# Patient Record
Sex: MALE | Race: WHITE | NOT HISPANIC OR LATINO | Employment: OTHER | URBAN - NONMETROPOLITAN AREA
[De-identification: names, ages, dates, MRNs, and addresses within clinical notes are randomized per-mention and may not be internally consistent; named-entity substitution may affect disease eponyms.]

---

## 2021-03-03 ENCOUNTER — HOSPITAL ENCOUNTER (OUTPATIENT)
Dept: GENERAL RADIOLOGY | Facility: HOSPITAL | Age: 51
Discharge: HOME | End: 2021-03-03
Attending: NURSE PRACTITIONER

## 2021-03-03 ENCOUNTER — OFFICE VISIT CONVERTED (OUTPATIENT)
Dept: FAMILY MEDICINE CLINIC | Facility: CLINIC | Age: 51
End: 2021-03-03
Attending: NURSE PRACTITIONER

## 2021-03-03 LAB
ALBUMIN SERPL-MCNC: 4.4 G/DL (ref 3.5–5)
ALBUMIN/GLOB SERPL: 1.6 {RATIO} (ref 1.4–2.6)
ALP SERPL-CCNC: 111 U/L (ref 53–128)
ALT SERPL-CCNC: 25 U/L (ref 10–40)
ANION GAP SERPL CALC-SCNC: 20 MMOL/L (ref 8–19)
AST SERPL-CCNC: 24 U/L (ref 15–50)
BASOPHILS # BLD AUTO: 0.05 10*3/UL (ref 0–0.2)
BASOPHILS NFR BLD AUTO: 0.9 % (ref 0–3)
BILIRUB SERPL-MCNC: 0.42 MG/DL (ref 0.2–1.3)
BUN SERPL-MCNC: 8 MG/DL (ref 5–25)
BUN/CREAT SERPL: 8 {RATIO} (ref 6–20)
CALCIUM SERPL-MCNC: 9.7 MG/DL (ref 8.7–10.4)
CHLORIDE SERPL-SCNC: 101 MMOL/L (ref 99–111)
CHOLEST SERPL-MCNC: 204 MG/DL (ref 107–200)
CHOLEST/HDLC SERPL: 5.5 {RATIO} (ref 3–6)
CONV ABS IMM GRAN: 0.01 10*3/UL (ref 0–0.2)
CONV CO2: 24 MMOL/L (ref 22–32)
CONV IMMATURE GRAN: 0.2 % (ref 0–1.8)
CONV TOTAL PROTEIN: 7.1 G/DL (ref 6.3–8.2)
CREAT UR-MCNC: 0.95 MG/DL (ref 0.7–1.2)
DEPRECATED RDW RBC AUTO: 41.2 FL (ref 35.1–43.9)
EOSINOPHIL # BLD AUTO: 0.14 10*3/UL (ref 0–0.7)
EOSINOPHIL # BLD AUTO: 2.7 % (ref 0–7)
ERYTHROCYTE [DISTWIDTH] IN BLOOD BY AUTOMATED COUNT: 12.5 % (ref 11.6–14.4)
GFR SERPLBLD BASED ON 1.73 SQ M-ARVRAT: >60 ML/MIN/{1.73_M2}
GLOBULIN UR ELPH-MCNC: 2.7 G/DL (ref 2–3.5)
GLUCOSE SERPL-MCNC: 99 MG/DL (ref 70–99)
HCT VFR BLD AUTO: 44.8 % (ref 42–52)
HDLC SERPL-MCNC: 37 MG/DL (ref 40–60)
HGB BLD-MCNC: 14.9 G/DL (ref 14–18)
LDLC SERPL CALC-MCNC: 122 MG/DL (ref 70–100)
LYMPHOCYTES # BLD AUTO: 1.09 10*3/UL (ref 1–5)
LYMPHOCYTES NFR BLD AUTO: 20.6 % (ref 20–45)
MCH RBC QN AUTO: 30.2 PG (ref 27–31)
MCHC RBC AUTO-ENTMCNC: 33.3 G/DL (ref 33–37)
MCV RBC AUTO: 90.7 FL (ref 80–96)
MONOCYTES # BLD AUTO: 0.34 10*3/UL (ref 0.2–1.2)
MONOCYTES NFR BLD AUTO: 6.4 % (ref 3–10)
NEUTROPHILS # BLD AUTO: 3.65 10*3/UL (ref 2–8)
NEUTROPHILS NFR BLD AUTO: 69.2 % (ref 30–85)
NRBC CBCN: 0 % (ref 0–0.7)
OSMOLALITY SERPL CALC.SUM OF ELEC: 290 MOSM/KG (ref 273–304)
PLATELET # BLD AUTO: 283 10*3/UL (ref 130–400)
PMV BLD AUTO: 11.4 FL (ref 9.4–12.4)
POTASSIUM SERPL-SCNC: 4.1 MMOL/L (ref 3.5–5.3)
PSA SERPL-MCNC: 1.3 NG/ML (ref 0–4)
RBC # BLD AUTO: 4.94 10*6/UL (ref 4.7–6.1)
SODIUM SERPL-SCNC: 141 MMOL/L (ref 135–147)
TRIGL SERPL-MCNC: 225 MG/DL (ref 40–150)
TSH SERPL-ACNC: 1.61 M[IU]/L (ref 0.27–4.2)
VLDLC SERPL-MCNC: 45 MG/DL (ref 5–37)
WBC # BLD AUTO: 5.28 10*3/UL (ref 4.8–10.8)

## 2021-03-09 ENCOUNTER — OFFICE VISIT CONVERTED (OUTPATIENT)
Dept: CARDIOLOGY | Facility: CLINIC | Age: 51
End: 2021-03-09
Attending: SPECIALIST

## 2021-03-16 ENCOUNTER — OFFICE VISIT CONVERTED (OUTPATIENT)
Dept: CARDIOLOGY | Facility: CLINIC | Age: 51
End: 2021-03-16
Attending: SPECIALIST

## 2021-04-05 ENCOUNTER — CONVERSION ENCOUNTER (OUTPATIENT)
Dept: OTHER | Facility: HOSPITAL | Age: 51
End: 2021-04-05

## 2021-04-05 ENCOUNTER — OFFICE VISIT CONVERTED (OUTPATIENT)
Dept: FAMILY MEDICINE CLINIC | Facility: CLINIC | Age: 51
End: 2021-04-05
Attending: NURSE PRACTITIONER

## 2021-04-20 ENCOUNTER — OFFICE VISIT CONVERTED (OUTPATIENT)
Dept: CARDIOLOGY | Facility: CLINIC | Age: 51
End: 2021-04-20
Attending: SPECIALIST

## 2021-05-10 NOTE — PROCEDURES
Procedure Note      Patient Name: Colt Prieto   Patient ID: [id number]   Sex: Male   YOB: 1970    Primary Care Provider: Franny BAH   Referring Provider: Franny BAH    Visit Date: March 16, 2021    Provider: Jose David Frazier MD   Location: [facility]   Location Address: [address] [place] [address], [facility]   [place]  [id number]   Location Phone: [phone]          FINAL REPORT   HOLTER MONITOR REPORT  Date: 03/16/2021   Indication: Palpitations      Interpretation Date:  03/18/2021    24-HOUR HOLTER MONITOR      FINDINGS:   The patient was monitored for a period of 24 hours.  The maximum heart rate was 138 beats per minute, the minimum was 53 beats per minute. The average was 138 beats per minute. There were occasional PACs and PVCs.     CONCLUSION:    24-hour Holter monitor study reveals occasional PACs and PVCs. No significant tachy- or bradyarrhythmias.      Jose David Frazier MD  MARY/pap                   Electronically Signed by: Candice Noonan-, Other -Author on March 22, 2021 03:59:21 PM  Electronically Co-signed by: Jose David Frazier MD -Reviewer on March 23, 2021 12:39:51 PM

## 2021-05-10 NOTE — H&P
History and Physical      Patient Name: Colt Prieto   Patient ID: [id number]   Sex: Male   YOB: 1970    Primary Care Provider: Franny BAH   Referring Provider: Franny BAH    Visit Date: March 9, 2021    Provider: Jose David Frazier MD   Location[facility]   Location Address: [address] [place] [address], [address]   [place]  [id number]   Location Phone: [phone]          Chief Complaint     Palpitations and shortness of breath.       History Of Present Illness  Consult requested by: OLVIN Alonso   Colt Prieto is a 50 year old male with a history of asthma since childhood. Has shortness of breath on exertion. Has had some palpitations and increased heart rate, mostly on exertion for the last 3 months. No chest pains. No syncopal or presyncopal episodes. No dizziness.   PAST MEDICAL HISTORY: Positive for arthritis and hip surgery in 1991.   FAMILY HISTORY: Positive for diabetes; hypertension.   PSYCHOSOCIAL HISTORY: Denies alcohol use. Denies tobacco use.   CURRENT MEDICATIONS: Albuterol 90 mcg p.r.n.; Symbicort 160 mcg q.a.m.   ALLERGIES: No known drug allergies.      CARDIAC RISK FACTORS: History of hypertension. Not a smoker. No history of diabetes mellitus. Family history is negative.       Review of Systems  · Constitutional  o Admits  o : good general health lately  o Denies  o : fatigue, recent weight changes   · Eyes  o Denies  o : double vision  · HENT  o Denies  o : hearing loss or ringing, chronic sinus problem, swollen glands in neck  · Cardiovascular  o Admits  o : shortness of breath while walking or lying flat  o Denies  o : chest pain, palpitations (fast, fluttering, or skipping beats), swelling (feet, ankles, hands)  · Respiratory  o Admits  o : asthma or wheezing  o Denies  o : COPD  · Gastrointestinal  o Denies  o : ulcers, nausea or vomiting  · Neurologic  o Denies  o : lightheaded or dizzy, stroke,  "headaches  · Musculoskeletal  o Admits  o : joint pain, back pain  · Endocrine  o Admits  o : heat or cold intolerance  o Denies  o : thyroid disease, diabetes, excessive thirst or urination  · Heme-Lymph  o Denies  o : bleeding or bruising tendency, anemia      Vitals  Date Time BP Position Site L\R Cuff Size HR RR TEMP (F) WT  HT  BMI kg/m2 BSA m2 O2 Sat FR L/min FiO2 HC       03/09/2021 09:57 /88 Sitting    76 - R   146lbs 0oz 5'  9\" 21.56 1.8       03/09/2021 09:57 /94 Sitting    78 - R                   Physical Examination  · Constitutional  o Appearance  o : Awake, alert, cooperative, pleasant.  · Eyes  o Pupils and Irises  o : Pupils equal and reacting to light and accommodation.  · Ears, Nose, Mouth and Throat  o Ears  o : Tympanic membranes are normal.  o Nose  o : Clear with no maxillary tenderness.  o Oral Cavity  o : Clear.  · Neck  o Inspection/Palpation  o : No JVD, bruits, thyromegaly, or adenopathy. Trachea midline. No axillary or cervical lymphadenopathy.  o Thyroid  o : No thyroid enlargement.   · Respiratory  o Inspection of Chest  o : No chest wall deformities, moving equal.  o Auscultation of Lungs  o : Good air entry with vesicular breath sounds.  o Percussion of Chest  o : Resonant bilaterally.   o Palpation of Chest  o : Equal movements bilaterally.  · Cardiovascular  o Heart  o :   § Auscultation of Heart  § : PMI is in the 5th intercostal space. S1 and S2 regular. No S3. No S4. No murmurs.  o Peripheral Vascular System  o :   § Extremities  § : No pedal edema. Peripheral pulses well felt. No cyanosis.  · Gastrointestinal  o Abdominal Examination  o : No organomegaly, masses, tenderness, bruits, or abnormal pulsations. Bowel sounds are normal.  · Musculoskeletal  o General  o : Muscle strength is normal with normal tone.  · Skin and Subcutaneous Tissue  o General Inspection  o : No rash, petechiae, or suspicious skin lesions. Skin turgor is normal.  · EKG  o EKG  o : Was " performed in the office today  o Indications  o : Palpitations.  o Results  o : Showed sinus rhythm, left atrial enlargement.          Assessment     1.  Palpitations and tachycardia, probably related to his asthma. Will do a 24 hour Holter to rule out any significant apnea on echocardiogram. Heart rate is higher, start him on low dose beta blockers.   2.  Essential hypertension. Blood pressures range slightly high. Blood pressure is persistently high. Start him on antihypertensive agent.   3.  Bronchial asthma, continue bronchodilators. Managed by his PMD.     FOLLOW-UP:   See me back in a month.         Jose David Frazier MD  MARY/vh               Electronically Signed by: Katelin Thomas-, OT -Author on March 17, 2021 08:02:39 AM  Electronically Co-signed by: Jose David Frazier MD -Reviewer on March 18, 2021 09:13:01 AM

## 2021-05-10 NOTE — H&P
History and Physical      Patient Name: Colt Prieto   Patient ID: [id number]   Sex: Male   YOB: 1970    Referring Provider: Franny BAH    Visit Date: March 3, 2021    Provider: OLVIN Garcia   Location: [facility]   Location Address: [address] [place] [address]  [id number]   Location Phone: [phone]          Chief Complaint  · Establish care   · nerve issues  · soa      History Of Present Illness  Colt Prieto is a 50 year old male who presents for evaluation and treatment of:      New patient/establish care:    Previous patient of Franny Lynn.     Lives in [place], single, unemployed (laid off last year d/t Covid), details cars.    Pt has appt for treadmill stress test next week d/t increased anxiety/palpitations/heart racing/occasional SOB since father passed away Nov. 2020. Ordered by Franny Lynn.    Pt does to gaviota Yu. Has FU appt beginning of July 2021. Pt with asthma/allergies. Takes Symbicort and Ventolin PRN.     No recent labs.       Past Medical History  Disease Name Date Onset Notes   Allergies --  --    Arthritis --  --    Asthma --  --    Broken Bones --  --    Emphysema --  --    Head injury --  --    Shortness of Breath --  --    Sinus trouble --  --          Past Surgical History  Procedure Name Date Notes   Hip Surgery --  right          Medication List  Name Date Started Instructions   Symbicort 160-4.5 mcg/actuation inhalation HFA aerosol inhaler  inhale 2 puffs by inhalation route 2 times per day in the morning and evening   Ventolin HFA 90 mcg/actuation inhalation HFA aerosol inhaler  inhale 1 puff (90 mcg) by inhalation route every 6 hours as needed         Family Medical History  Disease Name Relative/Age Notes   Family history of lung cancer  --    Family history of brain cancer  --    Family history of pancreatic cancer  --    Family history of stroke  --    Family history of heart disease  --   "        Social History  Finding Status Start/Stop Quantity Notes   Alcohol Never --/-- --  --    Tobacco Never --/-- --  --          Review of Systems  · Constitutional  o Denies  o : fever, fatigue, weight loss, weight gain  · HENT  o Denies  o : headaches, nasal congestion, sore throat  · Cardiovascular  o Admits  o : palpitations, tachycardia  o Denies  o : lower extremity edema, claudication, chest pressure  · Respiratory  o Admits  o : shortness of breath  o Denies  o : wheezing, cough, hemoptysis, dyspnea on exertion  · Gastrointestinal  o Denies  o : nausea, vomiting, diarrhea, constipation, abdominal pain  · Musculoskeletal  o Denies  o : muscle pain, back pain  · Psychiatric  o Admits  o : anxiety  o Denies  o : depression, suicidal ideation, homicidal ideation      Vitals  Date Time BP Position Site L\R Cuff Size HR RR TEMP (F) WT  HT  BMI kg/m2 BSA m2 O2 Sat FR L/min FiO2 HC       03/03/2021 01:39 /93 Sitting    81 - R 20 97.2 149lbs 0oz 5'  9\" 22 1.81 97 %  21%    03/03/2021 01:39 /92 Sitting                       Physical Examination  · Constitutional  o Appearance  o : no acute distress, well-nourished  · Head and Face  o Head  o :   § Inspection  § : atraumatic, normocephalic  · Ears, Nose, Mouth and Throat  o Ears  o :   § External Ears  § : normal  o Nose  o :   § Intranasal Exam  § : nares patent  o Oral Cavity  o :   § Oral Mucosa  § : moist mucous membranes  · Neck  o Thyroid  o : gland size normal, nontender, no nodules or masses present on palpation, thyroid motion normal during swallowing  · Respiratory  o Respiratory Effort  o : breathing comfortably, symmetric chest rise  o Auscultation of Lungs  o : clear to asculatation bilaterally, no wheezes, rales, or rhonchii  · Cardiovascular  o Heart  o :   § Auscultation of Heart  § : regular rate and rhythm, no murmurs, rubs, or gallops  o Peripheral Vascular System  o :   § Extremities  § : no edema  · Lymphatic  o Neck  o : no " lymphadenopathy present, normal size  o Supraclavicular Nodes  o : no supraclavicular nodes  · Skin and Subcutaneous Tissue  o General Inspection  o : no lesions present, no areas of discoloration, skin turgor normal, texture normal  · Neurologic  o Mental Status Examination  o :   § Orientation  § : grossly oriented to person, place and time  o Gait and Station  o :   § Gait Screening  § : normal gait  · Psychiatric  o General  o : normal mood and affect              Assessment  · Allergic rhinitis due to allergen     477.9/J30.9  · Anxiety disorder     300.00/F41.9  · Depression     311/F32.9  · Essential hypertension     401.9/I10  · Screening for depression     V79.0/Z13.89  · Screening for lipid disorders     V77.91/Z13.220  · Screening for prostate cancer     V76.44/Z12.5  · Establishing care with new doctor, encounter for     V65.8/Z76.89  · Screening for thyroid disorder     V77.0/Z13.29  · Palpitations     785.1/R00.2  · SOB (shortness of breath)     786.05/R06.02       New patient/establish care:    Essential Htn/palpitations/anxiety:  Discussed tx options  Start Lisinopril 10mg PO qd  Take BP reg at home, keep log, bring to next appt  Keep appt for stress testing next week, notify that results need to come to our office  Low NA diet   Regular exercise/activity   Proceed to ED if S/S worsen or chest pain, back pain, HA, syncope, abd pain or other  FU 1 month    SOB/allergy/asthma:  Continue symbicort and Ventolin inhaler UAD   Avoid allergy/asthma triggers  Keep appt with pulm    Order for labs today       Plan  · Orders  o ACO-18: Negative screen for clinical depression using a standardized tool () - V79.0/Z13.89 - 03/03/2021  o Male Physical Primary Care Panel (CMP, CBC, TSH, Lipid, PSA) [facility] (83080, 72387, 03183, 80732, 29540, ) - 401.9/I10, V77.91/Z13.220, V76.44/Z12.5, V77.0/Z13.29 - 03/03/2021  o ACO-18: Negative screen for clinical depression using a standardized tool () - -  "03/03/2021  o ACO-14: Influenza immunization was not administered for reasons documented [facility] () - - 03/03/2021   patient declined   o ACO-39: Current medications updated and reviewed (1159F, ) - - 03/03/2021  · Medications  o lisinopril 10 mg oral tablet   SIG: take 1 tablet (10 mg) by oral route once daily for 30 days   DISP: (30) Tablet with 3 refills  Prescribed on 03/03/2021     o Medications have been Reconciled  o Transition of Care or Provider Policy  · Instructions  o Discussed the need for therapy, either with a certified counselor, psychologist, and/or family . If no improvement is noted or worsening of their condition, return to office or ER. But also discussed with patient that if they are non-responsive to the type of medication they may need to see a psychiatrist for further evaluation and management.  o Patient was given an SSRI/SSNRI medication and warned of possible side effects of the medication including potential for increased risk of suicidal thoughts and feelings. Patient was instructed that if they begin to exhibit any of these effects they will discontinue the medication immediately and contact our office or the ER ASAP.  o Patient agrees to a \"No Self Harm\" contract. Patient will either call , [facility], ER, [facility], [facility].  o Discussed the need for therapy, either with a certified counselor, psychologist, and/or family . If no improvement is noted or worsening of their condition, return to office or ER. But also discussed with patient that if they are non-responsive to the type of medication they may need to see a psychiatrist for further evaluation and management.  o Patient was given an SSRI/SSNRI medication and warned of possible side effects of the medication including potential for increased risk of suicidal thoughts and feelings. Patient was instructed that if they begin to exhibit any of these effects they will discontinue " "the medication immediately and contact our office or the ER ASAP.  o Patient agrees to a \"No Self Harm\" contract. Patient will either call us, 911, ER, [facility], [facility].  o Patient advised to monitor blood pressure (B/P) at home and journal readings. Patient informed that a B/P reading at home of more than 130/80 is considered hypertension. For readings greater uvzo255/90 or higher patient is advised to follow up in the office with readings for management. Patient advised to limit sodium intake.  o Depression Screen completed and scanned into the EMR under the designated folder within the patient's documents.  o Today's PHQ-9 result is __1_  o The provider screening met the required time of 15 minutes.  o Obtained a written consent for CORRIE query. Discussed the risk and benefits of the use of controlled substances with the patient, including the risk of tolerance and drug dependence. The patient has been counseled on the need to have an exit strategy, including potentially discontinuing the use of controlled substances. CORRIE has or will be reviewed as soon as it becomes avaliable.  o Patient is taking medications as prescribed and doing well.   o Take all medications as prescribed/directed.  o Patient was educated/instructed on their diagnosis, treatment and medications prior to discharge from the clinic today.  o Patient was instructed to exercise regularly.  o Patient instructed to seek medical attention urgently for new or worsening symptoms.  o Call the office with any concerns or questions.  o Bring all medicines with their bottles to each office visit.  o Risks, benefits, and alternatives were discussed with the patient. The patient is aware of risks associated with: all med and conditions.   o Chronic conditions reviewed and taken into consideration for today's treatment plan.  o Discussed Covid-19 precautions including, but not limited to, social distancing, avoid " touching your face, and hand washing.   · Disposition  o Call or Return if symptoms worsen or persist.  o Follow Up PRN.  o Follow Up in 1 month.  o Proceed to ED for all medical emergencies.            Electronically Signed by: OLVIN Garcia -Author on March 3, 2021 02:13:54 PM

## 2021-05-10 NOTE — PROCEDURES
"   Procedure Note      Patient Name: Colt Prieto   Patient ID: [id number]   Sex: Male   YOB: 1970    Primary Care Provider: Franny BAH   Referring Provider: Franny BAH    Visit Date: March 16, 2021    Provider: Jose David Frazier MD   Location: [facility]   Location Address: [address] [place] [address], [address]   [place]  [id number]   Location Phone: [phone]          FINAL REPORT   TRANSTHORACIC ECHOCARDIOGRAM REPORT    Diagnosis: Palpitations   Height: 5'9\" Weight: 146 B/P: 146/88 BSA: 1.8   Tech: JTW   MEASUREMENTS:  RVID (Diastole) : RVID. (NORMAL: 0.7 to 2.4 cm max)   LVID (Systole): 3.3 cm (Diastole): 5.0 cm . (NORMAL: 3.7 - 5.4 cm)   Posterior Wall Thickness (Diastole): 0.9 cm. (NORMAL: 0.8 - 1.1 cm)   Septal Thickness (Diastole): 0.9 cm. (NORMAL: 0.7 - 1.2 cm)   LAID (Systole): 3.0 cm. (NORMAL: 1.9 - 3.8 cm)   Aortic Root Diameter (Diastole): 2.8 cm. (NORMAL: 2.0 - 3.7 cm)   DOPPLER:  E/A ratio 1.3 (NORMAL 0.8-2.0)   DT: 257 msec (NORMAL 140-240 msec.)   IVRT 81 m/sec (NORMAL  m/sec.)   E/E': 6 (NORMAL <8 avg.)   COMMENTS:  The patient underwent 2-D, M-Mode, and Doppler examination, including pulse-wave, continuous-wave, and color-flow analysis; the study is technically adequate.   FINDINGS:  AORTIC VALVE: Normal. Tricuspid in appearance with normal central closure.   MITRAL VALVE: Normal. Bicuspid in appearance.   TRICUSPID VALVE: Normal.   PULMONIC VALVE: Not well visualized.   LEFT ATRIUM: Normal. No intracavitary masses or clots seen. LA volume index is 20 mL/m2.   AORTIC ROOT: Normal in size with adequate motion.   LEFT VENTRICLE: Normal left ventricular systolic function. Ejection fraction 56%.   RIGHT ATRIUM: Normal.   RIGHT VENTRICLE: Normal size and function.   PERICARDIUM: Unremarkable. No evidence of effusion.   INFERIOR VENA CAVA: Diameter is 1.7 cm.   DOPPLER: Doppler examination of the aortic, mitral, tricuspid, and pulmonary valves was " performed. Normal pulmonary artery systolic pressure by Doppler. There was mild mitral regurgitation.   Faxed: 03/22/2021      CONCLUSION:  1.  Normal left ventricular systolic function.  2.  Mild mitral regurgitation.      Jose David Frazier MD  MARY/pap                 Electronically Signed by: Candice Noonan-, Other -Author on March 22, 2021 11:38:11 AM  Electronically Co-signed by: Jose David Frazier MD -Reviewer on March 23, 2021 12:43:24 PM

## 2021-05-14 VITALS
RESPIRATION RATE: 20 BRPM | HEART RATE: 81 BPM | DIASTOLIC BLOOD PRESSURE: 93 MMHG | SYSTOLIC BLOOD PRESSURE: 153 MMHG | HEIGHT: 69 IN | TEMPERATURE: 97.2 F | OXYGEN SATURATION: 97 % | BODY MASS INDEX: 22.07 KG/M2 | WEIGHT: 149 LBS

## 2021-05-14 VITALS
TEMPERATURE: 97.8 F | BODY MASS INDEX: 21.94 KG/M2 | WEIGHT: 148.12 LBS | HEART RATE: 76 BPM | DIASTOLIC BLOOD PRESSURE: 88 MMHG | RESPIRATION RATE: 18 BRPM | HEIGHT: 69 IN | SYSTOLIC BLOOD PRESSURE: 143 MMHG | OXYGEN SATURATION: 100 %

## 2021-05-14 VITALS
HEART RATE: 68 BPM | SYSTOLIC BLOOD PRESSURE: 148 MMHG | DIASTOLIC BLOOD PRESSURE: 88 MMHG | HEIGHT: 69 IN | WEIGHT: 143 LBS | BODY MASS INDEX: 21.18 KG/M2

## 2021-05-14 VITALS
BODY MASS INDEX: 21.62 KG/M2 | DIASTOLIC BLOOD PRESSURE: 88 MMHG | HEIGHT: 69 IN | WEIGHT: 146 LBS | HEART RATE: 76 BPM | SYSTOLIC BLOOD PRESSURE: 146 MMHG

## 2021-05-14 NOTE — PROGRESS NOTES
Progress Note      Patient Name: Colt Prieto   Patient ID: [id number]   Sex: Male   YOB: 1970    Primary Care Provider: Jelena BAH   Referring Provider: Jelena BAH    Visit Date: April 5, 2021    Provider: OLVIN Garcia   Location: [facility]   Location Address: [address] [place] [address]  [id number]   Location Phone: [phone]          Chief Complaint  · Follow up for anxiety   · follow up on blood pressure       History Of Present Illness  Colt Prieto is a 50 year old male who presents for evaluation and treatment of:      Pt presents FU anxiety/hypertension. States anxiety is improving, denies any recent issues with anxiety. Taking lisinpril 10mg PO qd. Denies any med SE/AEs. Recent visit to cardiologist, echo, 24 hour holter WNL. States he has FU with [facility] next week. Denies HA, blurred vision, SOB, chest pain, or other.       Past Medical History  Disease Name Date Onset Notes   Allergies --  --    Arthritis --  --    Asthma --  --    Broken Bones --  --    Emphysema --  --    Head injury --  --    Shortness of Breath --  --    Sinus trouble --  --          Past Surgical History  Procedure Name Date Notes   Hip Surgery --  right          Medication List  Name Date Started Instructions   lisinopril 10 mg oral tablet 03/03/2021 take 1 tablet (10 mg) by oral route once daily for 30 days   Symbicort 160-4.5 mcg/actuation inhalation HFA aerosol inhaler  inhale 2 puffs by inhalation route 2 times per day in the morning and evening   Ventolin HFA 90 mcg/actuation inhalation HFA aerosol inhaler  inhale 1 puff (90 mcg) by inhalation route every 6 hours as needed         Family Medical History  Disease Name Relative/Age Notes   Family history of lung cancer  --    Family history of brain cancer  --    Family history of pancreatic cancer  --    Family history of stroke  --    Family history of heart disease  --          Social  "History  Finding Status Start/Stop Quantity Notes   Alcohol Never --/-- --  --    Tobacco Never --/-- --  --          Review of Systems  · Constitutional  o Denies  o : fever, fatigue, weight loss, weight gain  · HENT  o Denies  o : headaches, nasal congestion, sore throat  · Cardiovascular  o Denies  o : lower extremity edema, claudication, chest pressure, palpitations  · Respiratory  o Denies  o : shortness of breath, wheezing, cough, hemoptysis, dyspnea on exertion  · Gastrointestinal  o Denies  o : nausea, vomiting, diarrhea, constipation, abdominal pain  · Musculoskeletal  o Denies  o : muscle pain, back pain  · Psychiatric  o Denies  o : anxiety, depression, suicidal ideation, homicidal ideation      Vitals  Date Time BP Position Site L\R Cuff Size HR RR TEMP (F) WT  HT  BMI kg/m2 BSA m2 O2 Sat FR L/min FiO2        04/05/2021 01:02 /88 Sitting    76 - R 18 97.8 148lbs 2oz 5'  9\" 21.87 1.81 100 %  21%          Physical Examination  · Constitutional  o Appearance  o : no acute distress, well-nourished  · Head and Face  o Head  o :   § Inspection  § : atraumatic, normocephalic  · Ears, Nose, Mouth and Throat  o Ears  o :   § External Ears  § : normal  o Nose  o :   § Intranasal Exam  § : nares patent  o Oral Cavity  o :   § Oral Mucosa  § : moist mucous membranes  · Neck  o Thyroid  o : gland size normal, nontender, no nodules or masses present on palpation, thyroid motion normal during swallowing  · Respiratory  o Respiratory Effort  o : breathing comfortably, symmetric chest rise  o Auscultation of Lungs  o : clear to asculatation bilaterally, no wheezes, rales, or rhonchii  · Cardiovascular  o Heart  o :   § Auscultation of Heart  § : regular rate and rhythm, no murmurs, rubs, or gallops  o Peripheral Vascular System  o :   § Extremities  § : no edema  · Lymphatic  o Neck  o : no lymphadenopathy present, normal size  o Supraclavicular Nodes  o : no supraclavicular nodes  · Skin and Subcutaneous " Tissue  o General Inspection  o : no lesions present, no areas of discoloration, skin turgor normal, texture normal  · Neurologic  o Mental Status Examination  o :   § Orientation  § : grossly oriented to person, place and time  o Gait and Station  o :   § Gait Screening  § : normal gait  · Psychiatric  o General  o : normal mood and affect              Assessment  · Allergic rhinitis due to allergen     477.9/J30.9  · Anxiety disorder     300.00/F41.9  · Asthma     493.90/J45.909  · Essential hypertension     401.9/I10       Anxiety:  Controlled  No meds at this time  Discused non-pharm treatments  Consider therapy     Hypertension:  Elevated  Pt believes elevation is d/t being at doctor office  Rae list from home, BP running low 100's/70's average at home  Continue lisinpril 10mg PO qd  Keep FU appt with [facility] next week  Low NA diet  Reg exercise/activity    Ashtma/allergic rhinitis:  Refilled symbicort 2 puffs BID  Refilled albuterol inhaler UAD prn  Advised daily antihistamine       Plan  · Orders  o ACO-14: Influenza immunization was not administered for reasons documented [facility] () - - 04/05/2021   pt declined   o ACO-39: Current medications updated and reviewed (1159F, ) - - 04/05/2021  · Medications  o Symbicort 160-4.5 mcg/actuation inhalation HFA aerosol inhaler   SIG: inhale 2 puffs by inhalation route 2 times per day in the morning and evening   DISP: (1) Inhaler with 3 refills  Prescribed on 04/05/2021     o Ventolin HFA 90 mcg/actuation inhalation HFA aerosol inhaler   SIG: inhale 1 puff (90 mcg) by inhalation route every 6 hours as needed   DISP: (1) Puff with 3 refills  Prescribed on 04/05/2021     · Instructions  o Discussed the need for therapy, either with a certified counselor, psychologist, and/or family . If no improvement is noted or worsening of their condition, return to office or ER. But also discussed with patient that if they are non-responsive to the type of  "medication they may need to see a psychiatrist for further evaluation and management.  o Patient agrees to a \"No Self Harm\" contract. Patient will either call us, 1, , [facility], [facility].  o Patient advised to monitor blood pressure (B/P) at home and journal readings. Patient informed that a B/P reading at home of more than 130/80 is considered hypertension. For readings greater lavp623/90 or higher patient is advised to follow up in the office with readings for management. Patient advised to limit sodium intake.  o Patient is taking medications as prescribed and doing well.   o Take all medications as prescribed/directed.  o Patient was educated/instructed on their diagnosis, treatment and medications prior to discharge from the clinic today.  o Patient was instructed to exercise regularly.  o Patient instructed to seek medical attention urgently for new or worsening symptoms.  o Call the office with any concerns or questions.  o Bring all medicines with their bottles to each office visit.  o Risks, benefits, and alternatives were discussed with the patient. The patient is aware of risks associated with: all med and conditions.   o Chronic conditions reviewed and taken into consideration for today's treatment plan.  o Discussed Covid-19 precautions including, but not limited to, social distancing, avoid touching your face, and hand washing.   · Disposition  o Call or Return if symptoms worsen or persist.  o Follow Up PRN.  o Follow Up in 3 months.  o Proceed to ED for all medical emergencies.            Electronically Signed by: OLVIN Garcia -Author on April 5, 2021 01:24:45 PM  "

## 2021-07-12 ENCOUNTER — OFFICE VISIT (OUTPATIENT)
Dept: PULMONOLOGY | Facility: CLINIC | Age: 51
End: 2021-07-12

## 2021-07-12 VITALS
HEART RATE: 71 BPM | BODY MASS INDEX: 21.48 KG/M2 | HEIGHT: 69 IN | SYSTOLIC BLOOD PRESSURE: 133 MMHG | WEIGHT: 145 LBS | OXYGEN SATURATION: 100 % | TEMPERATURE: 98.8 F | DIASTOLIC BLOOD PRESSURE: 81 MMHG | RESPIRATION RATE: 17 BRPM

## 2021-07-12 DIAGNOSIS — J98.4 OCCUPATIONAL LUNG DISEASE: Primary | ICD-10-CM

## 2021-07-12 DIAGNOSIS — Z91.018 MULTIPLE FOOD ALLERGIES: ICD-10-CM

## 2021-07-12 DIAGNOSIS — J45.902 ASTHMA WITH STATUS ASTHMATICUS, UNSPECIFIED ASTHMA SEVERITY, UNSPECIFIED WHETHER PERSISTENT: ICD-10-CM

## 2021-07-12 DIAGNOSIS — R06.02 SOB (SHORTNESS OF BREATH) ON EXERTION: ICD-10-CM

## 2021-07-12 DIAGNOSIS — R05.9 COUGH: ICD-10-CM

## 2021-07-12 PROBLEM — J45.909 ASTHMA DUE TO ENVIRONMENTAL ALLERGIES: Status: ACTIVE | Noted: 2021-07-12

## 2021-07-12 PROCEDURE — 99203 OFFICE O/P NEW LOW 30 MIN: CPT | Performed by: SPECIALIST

## 2021-07-12 NOTE — PROGRESS NOTES
CONSULT NOTE     CHIEF COMPLAINT  Chief Complaint   Patient presents with   • Shortness of Breath        Primary Care Provider  Jelena Campos APRN     Referring Provider  No ref. provider found    Patient Complaint    ICD-10-CM ICD-9-CM   1. Occupational lung disease  J98.4 518.89   2. Asthma with status asthmaticus, unspecified asthma severity, unspecified whether persistent  J45.902 493.91   3. Cough  R05 786.2   4. SOB (shortness of breath) on exertion  R06.02 786.05   5. Multiple food allergies  Z91.018 V15.05            Subjective          History of Presenting Illness  Colt Prieto is a 50 y.o. male referred to our practice because of persistent shortness of breath getting worse and cough.  Patient was seen by Dr. Jang couple of years ago and because of the patient's occupation cannot modify any medications at the time.  Patient was seen by allergy asthma in [place] who recommended the patient should be seen by pulmonologist.  Patient also saw the cardiologist who read the echocardiogram and Holter monitor for paroxysmal supraventricular tachycardia and was told that patient is doing fine and do not require any follow-up for 1 year.  Patient cough is usually dry.  Denied any hemoptysis or any weight loss.  He does not want to get the COVID-19 vaccination and never had any pets and not a smoker.  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    Review of Systems: General review of the systems and the pulmonary related review the systems are done and as documented and nondiagnostic otherwise.    HISTORY    Family History   Problem Relation Age of Onset   • Lung cancer Other    • Brain cancer Other    • Pancreatic cancer Other    • Stroke Other    • Heart disease Other         Social History     Socioeconomic History   • Marital status: Unknown     Spouse name: Not on file   • Number of children: Not on file   • Years of education: Not on file   • Highest education  level: Not on file   Tobacco Use   • Smoking status: Never Smoker   • Smokeless tobacco: Never Used   Substance and Sexual Activity   • Alcohol use: Never        Past Medical History:   Diagnosis Date   • Allergies    • Arthritis    • Asthma    • Broken bones    • Emphysema lung (CMS/HCC)    • Head injury    • Sinus trouble    • SOB (shortness of breath)           There is no immunization history on file for this patient.    No Known Allergies       Current Outpatient Medications:   •  albuterol sulfate  (90 Base) MCG/ACT inhaler, Inhale 2 puffs Every 4 (Four) Hours As Needed., Disp: , Rfl:   •  budesonide-formoterol (SYMBICORT) 160-4.5 MCG/ACT inhaler, Inhale 2 puffs 2 (Two) Times a Day., Disp: , Rfl:   •  lisinopril (PRINIVIL,ZESTRIL) 10 MG tablet, Take 10 mg by mouth Daily., Disp: , Rfl:      Smoking status: Non-smoker    Objective     Vital Signs:   Vitals:    07/12/21 0950   BP: 133/81   Pulse: 71   Resp: 17   Temp: 98.8 °F (37.1 °C)   SpO2: 100%        Physical Exam:  HEENT showed significant deviated nasal septum.  Mallampati class III airway.  Neck supple no JVD no mass palpable trachea central.  Lungs: Decreased breath sounds with occasional scattered rhonchi.  Cardiovascular system: Regular rate and rhythm.  No murmurs.  Abdomen soft and benign.  Extremities no clubbing/no cyanosis/no edema.  Central nervous system is grossly intact.  Result Review :   I do not have any work-up available to me.  Apparently patient had the CT scan of the chest and PFT long time ago and the reports are not available to us and we are unable to look into the previous medical history from the system.         Impression:  Asthma  Allergies  Occupational lung disease  Declined about COVID-19 vaccination or test    Plan:  Discussed briefly about the COVID-19 vaccination and its pros and cons.  We will get the high-resolution CT scan of the chest including the expiratory/inspiratory and prone position to rule out any other  underlying silicosis and other related disorders.  Complete pulmonary function testing, arterial blood gases and also bronchodilator testing.  Allergy testing and any other connective tissue disorders  QuantiFERON gold for TB and other related as ordered.      Follow Up   Return in about 4 weeks (around 8/9/2021).  Patient was given instructions and counseling regarding his condition or for health maintenance advice. Please see specific information pulled into the AVS if appropriate.     Markell Wang MD

## 2021-07-20 ENCOUNTER — OFFICE VISIT (OUTPATIENT)
Dept: FAMILY MEDICINE CLINIC | Facility: CLINIC | Age: 51
End: 2021-07-20

## 2021-07-20 ENCOUNTER — LAB (OUTPATIENT)
Dept: LAB | Facility: HOSPITAL | Age: 51
End: 2021-07-20

## 2021-07-20 VITALS
WEIGHT: 145.9 LBS | SYSTOLIC BLOOD PRESSURE: 139 MMHG | BODY MASS INDEX: 21.61 KG/M2 | HEIGHT: 69 IN | RESPIRATION RATE: 20 BRPM | DIASTOLIC BLOOD PRESSURE: 91 MMHG | TEMPERATURE: 98.1 F | OXYGEN SATURATION: 99 % | HEART RATE: 80 BPM

## 2021-07-20 DIAGNOSIS — J98.4 OCCUPATIONAL LUNG DISEASE: ICD-10-CM

## 2021-07-20 DIAGNOSIS — Z91.018 MULTIPLE FOOD ALLERGIES: ICD-10-CM

## 2021-07-20 DIAGNOSIS — R05.9 COUGH: ICD-10-CM

## 2021-07-20 DIAGNOSIS — I10 ESSENTIAL HYPERTENSION: Primary | ICD-10-CM

## 2021-07-20 DIAGNOSIS — J45.902 ASTHMA WITH STATUS ASTHMATICUS, UNSPECIFIED ASTHMA SEVERITY, UNSPECIFIED WHETHER PERSISTENT: ICD-10-CM

## 2021-07-20 DIAGNOSIS — R06.02 SOB (SHORTNESS OF BREATH) ON EXERTION: ICD-10-CM

## 2021-07-20 DIAGNOSIS — R42 DIZZINESS: ICD-10-CM

## 2021-07-20 DIAGNOSIS — J45.909 ASTHMA DUE TO ENVIRONMENTAL ALLERGIES: ICD-10-CM

## 2021-07-20 LAB
ALBUMIN SERPL-MCNC: 4.6 G/DL (ref 3.5–5.2)
ALBUMIN/GLOB SERPL: 2 G/DL
ALP SERPL-CCNC: 117 U/L (ref 39–117)
ALT SERPL W P-5'-P-CCNC: 21 U/L (ref 1–41)
ANION GAP SERPL CALCULATED.3IONS-SCNC: 10.2 MMOL/L (ref 5–15)
AST SERPL-CCNC: 18 U/L (ref 1–40)
BASOPHILS # BLD AUTO: 0.08 10*3/MM3 (ref 0–0.2)
BASOPHILS NFR BLD AUTO: 1.1 % (ref 0–1.5)
BILIRUB SERPL-MCNC: 0.4 MG/DL (ref 0–1.2)
BUN SERPL-MCNC: 9 MG/DL (ref 6–20)
BUN/CREAT SERPL: 10.1 (ref 7–25)
CALCIUM SPEC-SCNC: 9.6 MG/DL (ref 8.6–10.5)
CHLORIDE SERPL-SCNC: 101 MMOL/L (ref 98–107)
CO2 SERPL-SCNC: 27.8 MMOL/L (ref 22–29)
CREAT SERPL-MCNC: 0.89 MG/DL (ref 0.76–1.27)
DEPRECATED RDW RBC AUTO: 40.6 FL (ref 37–54)
EOSINOPHIL # BLD AUTO: 0.24 10*3/MM3 (ref 0–0.4)
EOSINOPHIL NFR BLD AUTO: 3.3 % (ref 0.3–6.2)
ERYTHROCYTE [DISTWIDTH] IN BLOOD BY AUTOMATED COUNT: 12.1 % (ref 12.3–15.4)
GFR SERPL CREATININE-BSD FRML MDRD: 91 ML/MIN/1.73
GLOBULIN UR ELPH-MCNC: 2.3 GM/DL
GLUCOSE SERPL-MCNC: 86 MG/DL (ref 65–99)
HCT VFR BLD AUTO: 47.2 % (ref 37.5–51)
HGB BLD-MCNC: 16 G/DL (ref 13–17.7)
IGA1 MFR SER: 265 MG/DL (ref 70–400)
IGG1 SER-MCNC: 957 MG/DL (ref 700–1600)
IGM SERPL-MCNC: 96 MG/DL (ref 40–230)
IMM GRANULOCYTES # BLD AUTO: 0.02 10*3/MM3 (ref 0–0.05)
IMM GRANULOCYTES NFR BLD AUTO: 0.3 % (ref 0–0.5)
LYMPHOCYTES # BLD AUTO: 1.16 10*3/MM3 (ref 0.7–3.1)
LYMPHOCYTES NFR BLD AUTO: 16 % (ref 19.6–45.3)
MCH RBC QN AUTO: 30.9 PG (ref 26.6–33)
MCHC RBC AUTO-ENTMCNC: 33.9 G/DL (ref 31.5–35.7)
MCV RBC AUTO: 91.1 FL (ref 79–97)
MONOCYTES # BLD AUTO: 0.47 10*3/MM3 (ref 0.1–0.9)
MONOCYTES NFR BLD AUTO: 6.5 % (ref 5–12)
NEUTROPHILS NFR BLD AUTO: 5.26 10*3/MM3 (ref 1.7–7)
NEUTROPHILS NFR BLD AUTO: 72.8 % (ref 42.7–76)
NRBC BLD AUTO-RTO: 0 /100 WBC (ref 0–0.2)
PLATELET # BLD AUTO: 288 10*3/MM3 (ref 140–450)
PMV BLD AUTO: 11.7 FL (ref 6–12)
POTASSIUM SERPL-SCNC: 4.3 MMOL/L (ref 3.5–5.2)
PROT SERPL-MCNC: 6.9 G/DL (ref 6–8.5)
RBC # BLD AUTO: 5.18 10*6/MM3 (ref 4.14–5.8)
SODIUM SERPL-SCNC: 139 MMOL/L (ref 136–145)
WBC # BLD AUTO: 7.23 10*3/MM3 (ref 3.4–10.8)

## 2021-07-20 PROCEDURE — 86003 ALLG SPEC IGE CRUDE XTRC EA: CPT

## 2021-07-20 PROCEDURE — 36415 COLL VENOUS BLD VENIPUNCTURE: CPT

## 2021-07-20 PROCEDURE — 82784 ASSAY IGA/IGD/IGG/IGM EACH: CPT

## 2021-07-20 PROCEDURE — 82785 ASSAY OF IGE: CPT

## 2021-07-20 PROCEDURE — 85025 COMPLETE CBC W/AUTO DIFF WBC: CPT

## 2021-07-20 PROCEDURE — 99214 OFFICE O/P EST MOD 30 MIN: CPT | Performed by: NURSE PRACTITIONER

## 2021-07-20 PROCEDURE — 80053 COMPREHEN METABOLIC PANEL: CPT

## 2021-07-20 PROCEDURE — 86480 TB TEST CELL IMMUN MEASURE: CPT

## 2021-07-20 RX ORDER — ALBUTEROL SULFATE 90 UG/1
2 AEROSOL, METERED RESPIRATORY (INHALATION) EVERY 4 HOURS PRN
Qty: 1 G | Refills: 5 | Status: SHIPPED | OUTPATIENT
Start: 2021-07-20 | End: 2022-06-17 | Stop reason: SDUPTHER

## 2021-07-20 RX ORDER — BUDESONIDE AND FORMOTEROL FUMARATE DIHYDRATE 160; 4.5 UG/1; UG/1
2 AEROSOL RESPIRATORY (INHALATION)
Qty: 1 EACH | Refills: 5 | Status: SHIPPED | OUTPATIENT
Start: 2021-07-20 | End: 2022-06-17 | Stop reason: SDUPTHER

## 2021-07-20 RX ORDER — LISINOPRIL 20 MG/1
20 TABLET ORAL DAILY
Qty: 30 TABLET | Refills: 5 | Status: SHIPPED | OUTPATIENT
Start: 2021-07-20 | End: 2021-08-17 | Stop reason: SDUPTHER

## 2021-07-20 NOTE — PATIENT INSTRUCTIONS
Blood Pressure Record Sheet  To take your blood pressure, you will need a blood pressure machine. You can buy a blood pressure machine (blood pressure monitor) at your clinic, drug store, or online. When choosing one, consider:  · An automatic monitor that has an arm cuff.  · A cuff that wraps snugly around your upper arm. You should be able to fit only one finger between your arm and the cuff.  · A device that stores blood pressure reading results.  · Do not choose a monitor that measures your blood pressure from your wrist or finger.  Follow your health care provider's instructions for how to take your blood pressure. To use this form:  · Get one reading in the morning (a.m.) before you take any medicines.  · Get one reading in the evening (p.m.) before supper.  · Take at least 2 readings with each blood pressure check. This makes sure the results are correct. Wait 1-2 minutes between measurements.  · Write down the results in the spaces on this form.  · Repeat this once a week, or as told by your health care provider.  · Make a follow-up appointment with your health care provider to discuss the results.  Blood pressure log  Date: _______________________  · a.m. _____________________(1st reading) _____________________(2nd reading)  · p.m. _____________________(1st reading) _____________________(2nd reading)  Date: _______________________  · a.m. _____________________(1st reading) _____________________(2nd reading)  · p.m. _____________________(1st reading) _____________________(2nd reading)  Date: _______________________  · a.m. _____________________(1st reading) _____________________(2nd reading)  · p.m. _____________________(1st reading) _____________________(2nd reading)  Date: _______________________  · a.m. _____________________(1st reading) _____________________(2nd reading)  · p.m. _____________________(1st reading) _____________________(2nd reading)  Date: _______________________  · a.m.  _____________________(1st reading) _____________________(2nd reading)  · p.m. _____________________(1st reading) _____________________(2nd reading)  This information is not intended to replace advice given to you by your health care provider. Make sure you discuss any questions you have with your health care provider.  Document Revised: 04/07/2021 Document Reviewed: 04/07/2021  Elsevier Patient Education © 2021 [employer]

## 2021-07-20 NOTE — PROGRESS NOTES
"Chief Complaint   Patient presents with   • Follow-up     Still having dizzy spells.       Subjective          Colt Prieto presents to [facility]    Pt presents for 3 month FU. Pt continues to have intermittent dizzy spells. Has been to cardiology and pulmonology. Echo and holter WNL. Has CT of chest scheduled 7/27/21 along with labs.       Past History:  Medical History: has a past medical history of Allergies, Arthritis, Asthma, Broken bones, Emphysema lung (CMS/HCC), Head injury, Sinus trouble, and SOB (shortness of breath).   Surgical History: has a past surgical history that includes Hip surgery (Right).   Family History: family history includes Brain cancer in an other family member; Heart disease in an other family member; Lung cancer in an other family member; Pancreatic cancer in an other family member; Stroke in an other family member.   Social History: reports that he has never smoked. He has never used smokeless tobacco. He reports that he does not drink alcohol.  Allergies: Patient has no known allergies.  (Not in a hospital admission)       Social History     Socioeconomic History   • Marital status: Unknown     Spouse name: Not on file   • Number of children: Not on file   • Years of education: Not on file   • Highest education level: Not on file   Tobacco Use   • Smoking status: Never Smoker   • Smokeless tobacco: Never Used   Substance and Sexual Activity   • Alcohol use: Never       There are no preventive care reminders to display for this patient.    Objective     Vital Signs:   /91   Pulse 80   Temp 98.1 °F (36.7 °C)   Resp 20   Ht 175.3 cm (69\")   Wt 66.2 kg (145 lb 14.4 oz)   SpO2 99%   BMI 21.55 kg/m²       Physical Exam  Vitals reviewed.   Constitutional:       General: He is not in acute distress.     Appearance: Normal appearance.   HENT:      Head: Normocephalic.      Right Ear: Tympanic membrane normal.      Left Ear: Tympanic membrane " normal.      Nose: Nose normal.      Mouth/Throat:      Pharynx: Oropharynx is clear. No posterior oropharyngeal erythema.   Eyes:      General: No scleral icterus.     Extraocular Movements: Extraocular movements intact.      Conjunctiva/sclera: Conjunctivae normal.      Pupils: Pupils are equal, round, and reactive to light.   Cardiovascular:      Rate and Rhythm: Normal rate and regular rhythm.      Pulses: Normal pulses.      Heart sounds: Normal heart sounds.   Pulmonary:      Effort: Pulmonary effort is normal.      Breath sounds: Normal breath sounds.   Abdominal:      General: Bowel sounds are normal.      Palpations: Abdomen is soft.   Musculoskeletal:         General: Normal range of motion.      Cervical back: Neck supple.   Skin:     General: Skin is warm and dry.   Neurological:      Mental Status: He is alert and oriented to person, place, and time.   Psychiatric:         Mood and Affect: Mood normal.         Behavior: Behavior normal.         Thought Content: Thought content normal.         Judgment: Judgment normal.          Review of Systems   Constitutional: Negative for chills, fatigue and fever.   HENT: Negative for congestion, ear pain, sinus pressure and sore throat.    Eyes: Negative for blurred vision.   Respiratory: Negative for cough and shortness of breath.    Cardiovascular: Negative for chest pain, palpitations and leg swelling.   Gastrointestinal: Negative for abdominal pain, constipation, diarrhea, nausea, vomiting and GERD.   Musculoskeletal: Negative for arthralgias.   Skin: Negative for rash and skin lesions.   Neurological: Positive for dizziness. Negative for headache.   Psychiatric/Behavioral: Negative for sleep disturbance, suicidal ideas and depressed mood. The patient is not nervous/anxious.         Result Review :     Common labs    Common Labsle 3/3/21   Glucose 99   BUN 8   Creatinine 0.95   Sodium 141   Potassium 4.1   Chloride 101   Calcium 9.7   Albumin 4.4   Total  Bilirubin 0.42   Alkaline Phosphatase 111   AST (SGOT) 24   ALT (SGPT) 25   WBC 5.28   Hemoglobin 14.9   Hematocrit 44.8   Platelets 283   Total Cholesterol 204 (A)   Triglycerides 225 (A)   HDL Cholesterol 37 (A)   LDL Cholesterol  122 (A)   PSA 1.30   (A) Abnormal value       Comments are available for some flowsheets but are not being displayed.                     Assessment and Plan    Diagnoses and all orders for this visit:    1. Essential hypertension (Primary)  Increase lisinopril  Take BP reg, keep log, bring to next appt  Low NA diet  Reg exercise/activity  Fu 1 month     2. Asthma due to environmental allergies  Controlled  Refilled inhalers UAD     3. Dizziness  Increase antihypertensive to treat  Reviewed labs WNL     Other orders  -     lisinopril (PRINIVIL,ZESTRIL) 20 MG tablet; Take 1 tablet by mouth Daily.  Dispense: 30 tablet; Refill: 5  -     budesonide-formoterol (SYMBICORT) 160-4.5 MCG/ACT inhaler; Inhale 2 puffs 2 (Two) Times a Day.  Dispense: 1 each; Refill: 5  -     albuterol sulfate  (90 Base) MCG/ACT inhaler; Inhale 2 puffs Every 4 (Four) Hours As Needed for Shortness of Air.  Dispense: 1 g; Refill: 5          Follow Up   Return in about 4 weeks (around 8/17/2021).  Patient was given instructions and counseling regarding his condition or for health maintenance advice. Please see specific information pulled into the AVS if appropriate.       Answers for HPI/ROS submitted by the patient on 7/20/2021  What is the primary reason for your visit?: Other  Please describe your symptoms.: Elevated blood pressure  Have you had these symptoms before?: Yes  How long have you been having these symptoms?: Greater than 2 weeks  Please list any medications you are currently taking for this condition.: Lisinopril  Please describe any probable cause for these symptoms. : None

## 2021-07-23 LAB
A ALTERNATA IGE QN: 3.83 KU/L
A FUMIGATUS IGE QN: 0.14 KU/L
AMER ROACH IGE QN: <0.1 KU/L
BAHIA GRASS IGE QN: 29.5 KU/L
BAYBERRY POLN IGE QN: 2.23 KU/L
BERMUDA GRASS IGE QN: 11.3 KU/L
BOXELDER IGE QN: 1.76 KU/L
C HERBARUM IGE QN: <0.1 KU/L
CAT DANDER IGE QN: 0.33 KU/L
CLAM IGE QN: 0.48 KU/L
CODFISH IGE QN: <0.1 KU/L
COMMON RAGWEED IGE QN: 17.8 KU/L
CONV CLASS DESCRIPTION: ABNORMAL
CONV CLASS DESCRIPTION: ABNORMAL
CORN IGE QN: 0.28 KU/L
COW MILK IGE QN: <0.1 KU/L
D FARINAE IGE QN: <0.1 KU/L
D PTERONYSS IGE QN: <0.1 KU/L
DOG DANDER IGE QN: 0.97 KU/L
DOG FENNEL IGE QN: 7.07 KU/L
EGG WHITE IGE QN: <0.1 KU/L
ENGL PLANTAIN IGE QN: 2.3 KU/L
GAMMA INTERFERON BACKGROUND BLD IA-ACNC: 0 IU/ML
GOOSEFOOT IGE QN: 1.62 KU/L
GUM-TREE IGE QN: 0.68 KU/L
IGE SERPL-ACNC: 355 IU/ML (ref 6–495)
ITALIAN CYPRESS IGE QN: 0.9 KU/L
JOHNSON GRASS IGE QN: 18.3 KU/L
M RACEMOSUS IGE QN: <0.1 KU/L
M TB IFN-G BLD-IMP: NEGATIVE
M TB IFN-G CD4+ BCKGRND COR BLD-ACNC: 0 IU/ML
M TB IFN-G CD4+CD8+ BCKGRND COR BLD-ACNC: 0 IU/ML
MITOGEN IGNF BLD-ACNC: >10 IU/ML
P NOTATUM IGE QN: 0.1 KU/L
PEANUT IGE QN: 0.67 KU/L
PEPPER TREE IGE QN: 0.48 KU/L
PER RYE GRASS IGE QN: 44.8 KU/L
PRIVET IGE QN: 1.11 KU/L
QUANTIFERON INCUBATION: NORMAL
QUEEN PALM IGE QN: 0.59 KU/L
S BOTRYOSUM IGE QN: 1.01 KU/L
SCALLOP IGE QN: <0.1 KU/L
SERVICE CMNT-IMP: NORMAL
SHEEP SORREL IGE QN: 2.5 KU/L
SHRIMP IGE QN: <0.1 KU/L
SOYBEAN IGE QN: 0.38 KU/L
VIRG LIVE OAK IGE QN: 1.03 KU/L
WALNUT IGE QN: 0.17 KU/L
WHEAT IGE QN: 1.13 KU/L
WHITE ELM IGE QN: 2.97 KU/L

## 2021-07-27 ENCOUNTER — HOSPITAL ENCOUNTER (OUTPATIENT)
Dept: CT IMAGING | Facility: HOSPITAL | Age: 51
Discharge: HOME | End: 2021-07-27
Admitting: SPECIALIST

## 2021-07-27 DIAGNOSIS — J45.902 ASTHMA WITH STATUS ASTHMATICUS, UNSPECIFIED ASTHMA SEVERITY, UNSPECIFIED WHETHER PERSISTENT: ICD-10-CM

## 2021-07-27 DIAGNOSIS — J98.4 OCCUPATIONAL LUNG DISEASE: ICD-10-CM

## 2021-07-27 DIAGNOSIS — R06.02 SOB (SHORTNESS OF BREATH) ON EXERTION: ICD-10-CM

## 2021-07-27 DIAGNOSIS — R05.9 COUGH: ICD-10-CM

## 2021-07-27 PROCEDURE — 71250 CT THORAX DX C-: CPT | Performed by: RADIOLOGY

## 2021-07-27 PROCEDURE — 71250 CT THORAX DX C-: CPT

## 2021-08-17 ENCOUNTER — OFFICE VISIT (OUTPATIENT)
Dept: FAMILY MEDICINE CLINIC | Facility: CLINIC | Age: 51
End: 2021-08-17

## 2021-08-17 VITALS
HEART RATE: 74 BPM | BODY MASS INDEX: 21.77 KG/M2 | OXYGEN SATURATION: 100 % | SYSTOLIC BLOOD PRESSURE: 141 MMHG | WEIGHT: 147 LBS | DIASTOLIC BLOOD PRESSURE: 84 MMHG | RESPIRATION RATE: 20 BRPM | TEMPERATURE: 99 F | HEIGHT: 69 IN

## 2021-08-17 DIAGNOSIS — J30.9 ALLERGIC RHINITIS, UNSPECIFIED SEASONALITY, UNSPECIFIED TRIGGER: ICD-10-CM

## 2021-08-17 DIAGNOSIS — J98.4 OCCUPATIONAL LUNG DISEASE: ICD-10-CM

## 2021-08-17 DIAGNOSIS — I10 ESSENTIAL HYPERTENSION: Primary | ICD-10-CM

## 2021-08-17 PROCEDURE — 99214 OFFICE O/P EST MOD 30 MIN: CPT | Performed by: NURSE PRACTITIONER

## 2021-08-17 RX ORDER — CETIRIZINE HYDROCHLORIDE 10 MG/1
10 TABLET ORAL DAILY
Qty: 30 TABLET | Refills: 5 | Status: SHIPPED | OUTPATIENT
Start: 2021-08-17 | End: 2022-06-17 | Stop reason: SDUPTHER

## 2021-08-17 RX ORDER — LISINOPRIL 10 MG/1
10 TABLET ORAL DAILY
Qty: 30 TABLET | Refills: 5 | Status: SHIPPED | OUTPATIENT
Start: 2021-08-17 | End: 2022-06-17 | Stop reason: SDUPTHER

## 2021-08-17 NOTE — PROGRESS NOTES
Chief Complaint   Patient presents with   • Hypertension     Wants to discuss B/P medication       Subjective          Colt Prieto presents to [facility]    Pt presents for BP FU. Increased lisinopril to 20mg PO qd at last visit. States increase made him dizzy so he went back to 10mg dose. Has been taking BP reg at home running 110's/70's average. Brought BP machine with him today and ran within 2 points of office machines.     Pt c/o increase in allergic rhinitis for several mnths. Not taking anything to treat. C/o nasal congestion/stuffiness. States when he uses flonase he gets nosebleeds.       Past History:  Medical History: has a past medical history of Allergies, Arthritis, Asthma, Broken bones, Emphysema lung (CMS/HCC), Head injury, Sinus trouble, and SOB (shortness of breath).   Surgical History: has a past surgical history that includes Hip surgery (Right).   Family History: family history includes Brain cancer in an other family member; Heart disease in an other family member; Lung cancer in an other family member; Pancreatic cancer in an other family member; Stroke in an other family member.   Social History: reports that he has never smoked. He has never used smokeless tobacco. He reports that he does not drink alcohol and does not use drugs.  Allergies: Patient has no known allergies.  (Not in a hospital admission)       Social History     Socioeconomic History   • Marital status: Unknown     Spouse name: Not on file   • Number of children: Not on file   • Years of education: Not on file   • Highest education level: Not on file   Tobacco Use   • Smoking status: Never Smoker   • Smokeless tobacco: Never Used   Vaping Use   • Vaping Use: Never used   Substance and Sexual Activity   • Alcohol use: Never   • Drug use: Never       Health Maintenance Due   Topic Date Due   • TDAP/TD VACCINES (1 - Tdap) Never done       Objective     Vital Signs:   /84   Pulse 74    "Temp 99 °F (37.2 °C)   Resp 20   Ht 175.3 cm (69\")   Wt 66.7 kg (147 lb)   SpO2 100%   BMI 21.71 kg/m²       Physical Exam  Vitals reviewed.   Constitutional:       General: He is not in acute distress.     Appearance: Normal appearance.   HENT:      Head: Normocephalic.      Right Ear: Tympanic membrane normal.      Left Ear: Tympanic membrane normal.      Nose: Congestion present.      Mouth/Throat:      Pharynx: Oropharynx is clear. No posterior oropharyngeal erythema.   Eyes:      General: No scleral icterus.     Extraocular Movements: Extraocular movements intact.      Conjunctiva/sclera: Conjunctivae normal.      Pupils: Pupils are equal, round, and reactive to light.   Cardiovascular:      Rate and Rhythm: Normal rate and regular rhythm.      Pulses: Normal pulses.      Heart sounds: Normal heart sounds.   Pulmonary:      Effort: Pulmonary effort is normal.      Breath sounds: Normal breath sounds.   Abdominal:      General: Bowel sounds are normal.      Palpations: Abdomen is soft.   Musculoskeletal:         General: Normal range of motion.      Cervical back: Neck supple.   Skin:     General: Skin is warm and dry.   Neurological:      Mental Status: He is alert and oriented to person, place, and time.   Psychiatric:         Mood and Affect: Mood normal.         Behavior: Behavior normal.         Thought Content: Thought content normal.         Judgment: Judgment normal.          Review of Systems   Constitutional: Negative for chills, fatigue and fever.   HENT: Positive for congestion and nosebleeds. Negative for ear pain, sinus pressure and sore throat.    Eyes: Negative for blurred vision.   Respiratory: Negative for cough and shortness of breath.    Cardiovascular: Negative for chest pain, palpitations and leg swelling.   Gastrointestinal: Negative for abdominal pain, constipation, diarrhea, nausea, vomiting and GERD.   Musculoskeletal: Negative for arthralgias.   Skin: Negative for rash and skin " lesions.   Neurological: Positive for dizziness. Negative for headache.   Psychiatric/Behavioral: Negative for sleep disturbance, suicidal ideas and depressed mood. The patient is not nervous/anxious.         Result Review :     Common labs    Common Labsle 3/3/21 7/20/21 7/20/21     1500 1500   Glucose   86   Glucose 99     BUN 8  9   Creatinine 0.95  0.89   eGFR Non African Am   91   Sodium 141  139   Potassium 4.1  4.3   Chloride 101  101   Calcium 9.7  9.6   Albumin 4.4  4.60   Total Bilirubin 0.42  0.4   Alkaline Phosphatase 111  117   AST (SGOT) 24  18   ALT (SGPT) 25  21   WBC 5.28 7.23    Hemoglobin 14.9 16.0    Hematocrit 44.8 47.2    Platelets 283 288    Total Cholesterol 204 (A)     Triglycerides 225 (A)     HDL Cholesterol 37 (A)     LDL Cholesterol  122 (A)     PSA 1.30     (A) Abnormal value       Comments are available for some flowsheets but are not being displayed.           Data reviewed: Radiologic studies ct           Assessment and Plan    Diagnoses and all orders for this visit:    1. Essential hypertension (Primary)  Comments:  Continue lisinpril 10mg PO qd  Possible orthostatic hypertension  Reading at home WNL  Reviewed meds and labs with patient   Low nA diet  Take BP reg, keep log     2. Allergic rhinitis, unspecified seasonality, unspecified trigger  Comments:  Start zyrtec 10mg PO qd  Increase rest  increase clear fluids   Reviewed food allergy panel with pt   Keep FU with pulm in Oct     3. Occupational lung disease  Comments:  Keep all FU with pulm     Other orders  -     cetirizine (zyrTEC) 10 MG tablet; Take 1 tablet by mouth Daily.  Dispense: 30 tablet; Refill: 5  -     lisinopril (PRINIVIL,ZESTRIL) 10 MG tablet; Take 1 tablet by mouth Daily.  Dispense: 30 tablet; Refill: 5          Follow Up   Return in about 6 months (around 2/17/2022), or if symptoms worsen or fail to improve.  Patient was given instructions and counseling regarding his condition or for health maintenance advice.  Please see specific information pulled into the AVS if appropriate.

## 2021-09-20 NOTE — PROGRESS NOTES
"   Progress Note      Patient Name: Colt Prieto   Patient ID: [id number]   Sex: Male   YOB: 1970    Primary Care Provider: Jelena BAH   Referring Provider: Jelena BAH    Visit Date: April 20, 2021    Provider: Jose David Frazier MD   Location: [facility]   Location Address: [address] [place] [address], [address]   SERENE Altman  [id number]   Location Phone: [phone]          Chief Complaint     Palpitations.  Shortness of breath.       History Of Present Illness  Colt Prieto is a 50 year old male with a history of asthma and shortness of breath who comes in for a followup visit after his echo and Holter.   CURRENT MEDICATIONS: Albuterol; Symbicort.   PAST MEDICAL HISTORY: Arthritis with hip surgery in 1991; Asthma.   PSYCHOSOCIAL HISTORY: Denies alcohol use.      ALLERGIES: No known drug allergies.       Review of Systems  · Cardiovascular  o Admits  o : shortness of breath while walking or lying flat  o Denies  o : palpitations (fast, fluttering, or skipping beats), swelling (feet, ankles, hands), chest pain or angina pectoris   · Respiratory  o Denies  o : chronic or frequent cough      Vitals  Date Time BP Position Site L\R Cuff Size HR RR TEMP (F) WT  HT  BMI kg/m2 BSA m2 O2 Sat FR L/min FiO2 HC       04/20/2021 09:25 /88 Sitting    68 - R   143lbs 0oz 5'  9\" 21.12 1.78       04/20/2021 09:25 /88 Sitting    68 - R                   Physical Examination  · Constitutional  o Appearance  o : Awake, alert, cooperative, pleasant.  · Respiratory  o Inspection of Chest  o : No chest wall deformities, moving equal.  o Auscultation of Lungs  o : Good air entry with vesicular breath sounds.  · Cardiovascular  o Heart  o :   § Auscultation of Heart  § : S1 and S2 regular. No S3. No S4.   o Peripheral Vascular System  o :   § Extremities  § : Peripheral pulses were well felt. No edema. No cyanosis.  · Gastrointestinal  o Abdominal Examination  o : No masses or " Health Maintenance Due   Topic Date Due   • Influenza Vaccine (1) 09/01/2021       Patient is due for the topics as listed above and wishes to proceed with them. Orders placed for Immunization(s) Influenza.      Vaccine Information Statement(s) for Influenza (Inactivated) given and reviewed, questions answered, verbal consent given by Parent for injection(s) administered. Patient tolerated without incident. See immunization grid for documentation.    1.  Does the patient have a moderate to severe fever?  NO  2.  Has the patient had a serious reaction to a flu shot before?   NO  3.  Has the patient ever had Guillain-[place] Syndrome within 6 weeks of a previous flu shot? NO  4.  Does the patient have a serious allergy to eggs?  NO  5.  Does the patient have an allergy to latex?  NO       Note: This applies to [employer] Fluzone prefilled syringes only  6.  If person is answering for a child, is the child less than 6 months of age? NO    A \"YES\" answer to any question above means the patient is not eligible to receive the vaccine.               organomegaly noted.          Assessment     ASSESSMENT & PLAN:    1.  Shortness of breath, probably secondary to his asthma.  Echocardiogram shows normal left ventricular        systolic function and no significant valvular abnormalities, which I reviewed and discussed with the patient.  2.  Palpitations.  24-hour Holter showed no significant arrhythmias.  Discussed this with the patient.  Probably        secondary to his albuterol.  Suggest changing albuterol to another bronchodilator.  If he continues to have        palpitations, we will consider starting him on low-dose beta-blocker.  Be on a low-caffeine diet.    3.  See me back in one year.             Electronically Signed by: Rosanna Guardado-, Other -Author on April 24, 2021 08:41:38 AM  Electronically Co-signed by: Jose David Frazier MD -Reviewer on April 27, 2021 08:47:04 AM

## 2021-10-14 ENCOUNTER — HOSPITAL ENCOUNTER (OUTPATIENT)
Dept: RESPIRATORY THERAPY | Facility: HOSPITAL | Age: 51
End: 2021-10-14

## 2021-12-15 ENCOUNTER — OFFICE VISIT (OUTPATIENT)
Dept: FAMILY MEDICINE CLINIC | Facility: CLINIC | Age: 51
End: 2021-12-15

## 2021-12-15 VITALS
DIASTOLIC BLOOD PRESSURE: 88 MMHG | HEIGHT: 69 IN | SYSTOLIC BLOOD PRESSURE: 138 MMHG | BODY MASS INDEX: 21.8 KG/M2 | HEART RATE: 79 BPM | WEIGHT: 147.2 LBS | TEMPERATURE: 98.4 F | OXYGEN SATURATION: 100 %

## 2021-12-15 DIAGNOSIS — N50.89 TESTICULAR MASS: Primary | ICD-10-CM

## 2021-12-15 DIAGNOSIS — I10 ESSENTIAL HYPERTENSION: ICD-10-CM

## 2021-12-15 PROCEDURE — 99213 OFFICE O/P EST LOW 20 MIN: CPT | Performed by: NURSE PRACTITIONER

## 2021-12-15 NOTE — PROGRESS NOTES
"Chief Complaint  Testicular Mass    Subjective          Colt Prieto presents to [facility]  Pt states he noticed a pea sized mass in L testicle a few days ago. Denies pain, inflammation, erythema, edema, fever, chills or other. Denies any recent injuries. No family hx of testicular cancer. Last labs 4/21, reviewed. PSA WNL. Reviewed all medications.       Objective   Vital Signs:   /88   Pulse 79   Temp 98.4 °F (36.9 °C) (Temporal)   Ht 175.3 cm (69\")   Wt 66.8 kg (147 lb 3.2 oz)   SpO2 100%   BMI 21.74 kg/m²     Physical Exam  Vitals reviewed.   Constitutional:       General: He is not in acute distress.     Appearance: Normal appearance.   HENT:      Head: Normocephalic.      Right Ear: Tympanic membrane normal.      Left Ear: Tympanic membrane normal.      Nose: Nose normal.      Mouth/Throat:      Pharynx: Oropharynx is clear. No posterior oropharyngeal erythema.   Eyes:      General: No scleral icterus.     Extraocular Movements: Extraocular movements intact.      Conjunctiva/sclera: Conjunctivae normal.      Pupils: Pupils are equal, round, and reactive to light.   Cardiovascular:      Rate and Rhythm: Normal rate and regular rhythm.      Pulses: Normal pulses.      Heart sounds: Normal heart sounds.   Pulmonary:      Effort: Pulmonary effort is normal.      Breath sounds: Normal breath sounds.   Abdominal:      General: Bowel sounds are normal.      Palpations: Abdomen is soft.   Genitourinary:     Comments: Was not able to feel mass on examination, nontender to palpation, no erythema, no edema  Musculoskeletal:         General: Normal range of motion.      Cervical back: Neck supple.   Skin:     General: Skin is warm and dry.   Neurological:      Mental Status: He is alert and oriented to person, place, and time.   Psychiatric:         Mood and Affect: Mood normal.         Behavior: Behavior normal.         Thought Content: Thought content normal.         " Judgment: Judgment normal.        Result Review :     Common labs    Common Labsle 3/3/21 7/20/21 7/20/21     1500 1500   Glucose 99  86   BUN 8  9   Creatinine 0.95  0.89   eGFR Non African Am   91   Sodium 141  139   Potassium 4.1  4.3   Chloride 101  101   Calcium 9.7  9.6   Albumin 4.4  4.60   Total Bilirubin 0.42  0.4   Alkaline Phosphatase 111  117   AST (SGOT) 24  18   ALT (SGPT) 25  21   WBC 5.28 7.23    Hemoglobin 14.9 16.0    Hematocrit 44.8 47.2    Platelets 283 288    Total Cholesterol 204 (A)     Triglycerides 225 (A)     HDL Cholesterol 37 (A)     LDL Cholesterol  122 (A)     PSA 1.30     (A) Abnormal value       Comments are available for some flowsheets but are not being displayed.                     Assessment and Plan    Diagnoses and all orders for this visit:    1. Testicular mass (Primary)  Comments:  Order for testicular US  CTM     2. Essential hypertension  Comments:  Pt brought BP log, 's/70's at home  Continue medication as prescribed   Low NA diet   Continue to take BP reg at home, keep log   Assessment & Plan:  Hypertension is improving with treatment.  Continue current treatment regimen.  Dietary sodium restriction.  Weight loss.  Blood pressure will be reassessed at the next regular appointment.        Follow Up   No follow-ups on file.  Patient was given instructions and counseling regarding his condition or for health maintenance advice. Please see specific information pulled into the AVS if appropriate.

## 2021-12-15 NOTE — PATIENT INSTRUCTIONS
Blood Pressure Record Sheet  To take your blood pressure, you will need a blood pressure machine. You can buy a blood pressure machine (blood pressure monitor) at your clinic, drug store, or online. When choosing one, consider:  · An automatic monitor that has an arm cuff.  · A cuff that wraps snugly around your upper arm. You should be able to fit only one finger between your arm and the cuff.  · A device that stores blood pressure reading results.  · Do not choose a monitor that measures your blood pressure from your wrist or finger.  Follow your health care provider's instructions for how to take your blood pressure. To use this form:  · Get one reading in the morning (a.m.) before you take any medicines.  · Get one reading in the evening (p.m.) before supper.  · Take at least 2 readings with each blood pressure check. This makes sure the results are correct. Wait 1-2 minutes between measurements.  · Write down the results in the spaces on this form.  · Repeat this once a week, or as told by your health care provider.  · Make a follow-up appointment with your health care provider to discuss the results.  Blood pressure log  Date: _______________________  · a.m. _____________________(1st reading) _____________________(2nd reading)  · p.m. _____________________(1st reading) _____________________(2nd reading)  Date: _______________________  · a.m. _____________________(1st reading) _____________________(2nd reading)  · p.m. _____________________(1st reading) _____________________(2nd reading)  Date: _______________________  · a.m. _____________________(1st reading) _____________________(2nd reading)  · p.m. _____________________(1st reading) _____________________(2nd reading)  Date: _______________________  · a.m. _____________________(1st reading) _____________________(2nd reading)  · p.m. _____________________(1st reading) _____________________(2nd reading)  Date: _______________________  · a.m.  · Sodium was only 145 on admission on 11/18.   · Noted to have progressively increasing Na at OSH up to 153 prior to discharge. Could be explanation for patient's acute mental status change.   · Repeat here 154.   · Evaluated by nephrology there; treated on and off with D5NS and free water, but without consistent correction.   · Resolved initially after approximately 24 hours of D5W at low maintenance.   · Na up to 139 today; giving free water TID via NG daily for correction.    _____________________(1st reading) _____________________(2nd reading)  · p.m. _____________________(1st reading) _____________________(2nd reading)  This information is not intended to replace advice given to you by your health care provider. Make sure you discuss any questions you have with your health care provider.  Document Revised: 04/07/2021 Document Reviewed: 04/07/2021  Elsevier Patient Education © 2021 [employer]

## 2022-01-17 ENCOUNTER — APPOINTMENT (OUTPATIENT)
Dept: ULTRASOUND IMAGING | Facility: HOSPITAL | Age: 52
End: 2022-01-17

## 2022-02-11 ENCOUNTER — HOSPITAL ENCOUNTER (OUTPATIENT)
Dept: ULTRASOUND IMAGING | Facility: HOSPITAL | Age: 52
Discharge: HOME | End: 2022-02-11
Admitting: NURSE PRACTITIONER

## 2022-02-11 DIAGNOSIS — N50.89 TESTICULAR MASS: ICD-10-CM

## 2022-02-11 PROCEDURE — 76870 US EXAM SCROTUM: CPT

## 2022-02-18 ENCOUNTER — OFFICE VISIT (OUTPATIENT)
Dept: FAMILY MEDICINE CLINIC | Facility: CLINIC | Age: 52
End: 2022-02-18

## 2022-02-18 ENCOUNTER — LAB (OUTPATIENT)
Dept: LAB | Facility: HOSPITAL | Age: 52
End: 2022-02-18

## 2022-02-18 VITALS
WEIGHT: 150 LBS | TEMPERATURE: 98 F | HEART RATE: 73 BPM | HEIGHT: 69 IN | BODY MASS INDEX: 22.22 KG/M2 | OXYGEN SATURATION: 100 % | DIASTOLIC BLOOD PRESSURE: 86 MMHG | SYSTOLIC BLOOD PRESSURE: 125 MMHG | RESPIRATION RATE: 20 BRPM

## 2022-02-18 DIAGNOSIS — Z12.5 SCREENING FOR PROSTATE CANCER: ICD-10-CM

## 2022-02-18 DIAGNOSIS — N50.3 EPIDIDYMAL CYST: ICD-10-CM

## 2022-02-18 DIAGNOSIS — Z13.29 SCREENING FOR THYROID DISORDER: ICD-10-CM

## 2022-02-18 DIAGNOSIS — I10 ESSENTIAL HYPERTENSION: ICD-10-CM

## 2022-02-18 DIAGNOSIS — J45.909 ASTHMA DUE TO ENVIRONMENTAL ALLERGIES: ICD-10-CM

## 2022-02-18 DIAGNOSIS — E78.5 HYPERLIPIDEMIA, UNSPECIFIED HYPERLIPIDEMIA TYPE: ICD-10-CM

## 2022-02-18 DIAGNOSIS — I10 ESSENTIAL HYPERTENSION: Primary | ICD-10-CM

## 2022-02-18 DIAGNOSIS — J30.9 ALLERGIC RHINITIS, UNSPECIFIED SEASONALITY, UNSPECIFIED TRIGGER: ICD-10-CM

## 2022-02-18 LAB
ALBUMIN SERPL-MCNC: 4.5 G/DL (ref 3.5–5.2)
ALBUMIN/GLOB SERPL: 2 G/DL
ALP SERPL-CCNC: 106 U/L (ref 39–117)
ALT SERPL W P-5'-P-CCNC: 39 U/L (ref 1–41)
ANION GAP SERPL CALCULATED.3IONS-SCNC: 10 MMOL/L (ref 5–15)
AST SERPL-CCNC: 28 U/L (ref 1–40)
BASOPHILS # BLD AUTO: 0.07 10*3/MM3 (ref 0–0.2)
BASOPHILS NFR BLD AUTO: 1.1 % (ref 0–1.5)
BILIRUB SERPL-MCNC: 0.4 MG/DL (ref 0–1.2)
BUN SERPL-MCNC: 10 MG/DL (ref 6–20)
BUN/CREAT SERPL: 11.2 (ref 7–25)
CALCIUM SPEC-SCNC: 9.3 MG/DL (ref 8.6–10.5)
CHLORIDE SERPL-SCNC: 100 MMOL/L (ref 98–107)
CHOLEST SERPL-MCNC: 222 MG/DL (ref 0–200)
CO2 SERPL-SCNC: 29 MMOL/L (ref 22–29)
CREAT SERPL-MCNC: 0.89 MG/DL (ref 0.76–1.27)
DEPRECATED RDW RBC AUTO: 39.7 FL (ref 37–54)
EOSINOPHIL # BLD AUTO: 0.26 10*3/MM3 (ref 0–0.4)
EOSINOPHIL NFR BLD AUTO: 4.2 % (ref 0.3–6.2)
ERYTHROCYTE [DISTWIDTH] IN BLOOD BY AUTOMATED COUNT: 12.2 % (ref 12.3–15.4)
GFR SERPL CREATININE-BSD FRML MDRD: 91 ML/MIN/1.73
GLOBULIN UR ELPH-MCNC: 2.2 GM/DL
GLUCOSE SERPL-MCNC: 84 MG/DL (ref 65–99)
HCT VFR BLD AUTO: 43.8 % (ref 37.5–51)
HDLC SERPL-MCNC: 48 MG/DL (ref 40–60)
HGB BLD-MCNC: 15.1 G/DL (ref 13–17.7)
IMM GRANULOCYTES # BLD AUTO: 0.01 10*3/MM3 (ref 0–0.05)
IMM GRANULOCYTES NFR BLD AUTO: 0.2 % (ref 0–0.5)
LDLC SERPL CALC-MCNC: 158 MG/DL (ref 0–100)
LDLC/HDLC SERPL: 3.24 {RATIO}
LYMPHOCYTES # BLD AUTO: 1.2 10*3/MM3 (ref 0.7–3.1)
LYMPHOCYTES NFR BLD AUTO: 19.4 % (ref 19.6–45.3)
MCH RBC QN AUTO: 30.8 PG (ref 26.6–33)
MCHC RBC AUTO-ENTMCNC: 34.5 G/DL (ref 31.5–35.7)
MCV RBC AUTO: 89.4 FL (ref 79–97)
MONOCYTES # BLD AUTO: 0.54 10*3/MM3 (ref 0.1–0.9)
MONOCYTES NFR BLD AUTO: 8.7 % (ref 5–12)
NEUTROPHILS NFR BLD AUTO: 4.1 10*3/MM3 (ref 1.7–7)
NEUTROPHILS NFR BLD AUTO: 66.4 % (ref 42.7–76)
NRBC BLD AUTO-RTO: 0 /100 WBC (ref 0–0.2)
PLATELET # BLD AUTO: 257 10*3/MM3 (ref 140–450)
PMV BLD AUTO: 11.3 FL (ref 6–12)
POTASSIUM SERPL-SCNC: 4.1 MMOL/L (ref 3.5–5.2)
PROT SERPL-MCNC: 6.7 G/DL (ref 6–8.5)
PSA SERPL-MCNC: 1.47 NG/ML (ref 0–4)
RBC # BLD AUTO: 4.9 10*6/MM3 (ref 4.14–5.8)
SODIUM SERPL-SCNC: 139 MMOL/L (ref 136–145)
TRIGL SERPL-MCNC: 92 MG/DL (ref 0–150)
TSH SERPL DL<=0.05 MIU/L-ACNC: 1.35 UIU/ML (ref 0.27–4.2)
VLDLC SERPL-MCNC: 16 MG/DL (ref 5–40)
WBC NRBC COR # BLD: 6.18 10*3/MM3 (ref 3.4–10.8)

## 2022-02-18 PROCEDURE — 99214 OFFICE O/P EST MOD 30 MIN: CPT | Performed by: NURSE PRACTITIONER

## 2022-02-18 PROCEDURE — 80061 LIPID PANEL: CPT

## 2022-02-18 PROCEDURE — 36415 COLL VENOUS BLD VENIPUNCTURE: CPT

## 2022-02-18 PROCEDURE — G0103 PSA SCREENING: HCPCS

## 2022-02-18 PROCEDURE — 80050 GENERAL HEALTH PANEL: CPT

## 2022-02-18 NOTE — ASSESSMENT & PLAN NOTE
Hypertension is elevated in office today. .  Continue current treatment regimen.  Dietary sodium restriction.  Regular aerobic exercise.  Blood pressure will be reassessed at the next regular appointment. Keep BP log at home, notify if >135/85 consistently. Declines med increase at this time.

## 2022-02-18 NOTE — ASSESSMENT & PLAN NOTE
Asthma is improving with treatment.  The patient is experiencing monthly daytime asthma symptoms. He is experiencing monthly nighttime asthma symptoms.  Follow up in 6 months, or sooner should new symptoms or problems arise.

## 2022-02-18 NOTE — PROGRESS NOTES
"Chief Complaint   Patient presents with   • Testicle Pain     Not as often, feeling better.       Subjective          Colt Prieto presents to [facility]    Reviewed all recent labs and medications.    Pt presents 6 month FU. Pt with US of testicles on 12/16/21 revealing R epididymal cyst. States he has occasional testicular tenderness, but does not find it bothersome. Does not desire any further treatment.     Pt with asthma (uses ventolin inhaler a few times per week depending on the weather), htn (elevated today), hyperlipidemia (no meds). States he takes BP at home, running 120/70 consistently. He had machine calibrated at office recently.         Past History:  Medical History: has a past medical history of Allergies, Arthritis, Asthma, Broken bones, Emphysema lung (HCC), Head injury, Sinus trouble, and SOB (shortness of breath).   Surgical History: has a past surgical history that includes Hip surgery (Right).   Family History: family history includes Brain cancer in an other family member; Heart disease in an other family member; Lung cancer in an other family member; Pancreatic cancer in an other family member; Stroke in an other family member.   Social History: reports that he has never smoked. He has never used smokeless tobacco. He reports that he does not drink alcohol and does not use drugs.  Allergies: Patient has no known allergies.  (Not in a hospital admission)       Social History     Socioeconomic History   • Marital status: Unknown   Tobacco Use   • Smoking status: Never Smoker   • Smokeless tobacco: Never Used   Vaping Use   • Vaping Use: Never used   Substance and Sexual Activity   • Alcohol use: Never   • Drug use: Never       Health Maintenance Due   Topic Date Due   • TDAP/TD VACCINES (1 - Tdap) Never done       Objective     Vital Signs:   /86   Pulse 73   Temp 98 °F (36.7 °C)   Resp 20   Ht 175.3 cm (69\")   Wt 68 kg (150 lb)   SpO2 100%   " BMI 22.15 kg/m²       Physical Exam  Vitals reviewed.   Constitutional:       General: He is not in acute distress.     Appearance: Normal appearance.   HENT:      Head: Normocephalic.      Right Ear: Tympanic membrane normal.      Left Ear: Tympanic membrane normal.      Nose: Nose normal.      Mouth/Throat:      Pharynx: Oropharynx is clear. No posterior oropharyngeal erythema.   Eyes:      General: No scleral icterus.     Extraocular Movements: Extraocular movements intact.      Conjunctiva/sclera: Conjunctivae normal.      Pupils: Pupils are equal, round, and reactive to light.   Cardiovascular:      Rate and Rhythm: Normal rate and regular rhythm.      Pulses: Normal pulses.      Heart sounds: Normal heart sounds.   Pulmonary:      Effort: Pulmonary effort is normal.      Breath sounds: Normal breath sounds.   Abdominal:      General: Bowel sounds are normal.      Palpations: Abdomen is soft.   Musculoskeletal:         General: Normal range of motion.      Cervical back: Neck supple.   Skin:     General: Skin is warm and dry.   Neurological:      Mental Status: He is alert and oriented to person, place, and time.   Psychiatric:         Mood and Affect: Mood normal.         Behavior: Behavior normal.         Thought Content: Thought content normal.         Judgment: Judgment normal.          Review of Systems   Constitutional: Negative for chills, fatigue and fever.   HENT: Negative for congestion, ear pain, sinus pressure and sore throat.    Eyes: Negative for blurred vision.   Respiratory: Negative for cough and shortness of breath.    Cardiovascular: Negative for chest pain, palpitations and leg swelling.   Gastrointestinal: Negative for abdominal pain, constipation, diarrhea, nausea, vomiting and GERD.   Genitourinary: Positive for testicular pain.   Musculoskeletal: Negative for arthralgias.   Skin: Negative for rash and skin lesions.   Neurological: Negative for dizziness and headache.    Psychiatric/Behavioral: Negative for sleep disturbance, suicidal ideas and depressed mood. The patient is not nervous/anxious.         Result Review :     Common labs    Common Labsle 3/3/21 7/20/21 7/20/21     1500 1500   Glucose 99  86   BUN 8  9   Creatinine 0.95  0.89   eGFR Non African Am   91   Sodium 141  139   Potassium 4.1  4.3   Chloride 101  101   Calcium 9.7  9.6   Albumin 4.4  4.60   Total Bilirubin 0.42  0.4   Alkaline Phosphatase 111  117   AST (SGOT) 24  18   ALT (SGPT) 25  21   WBC 5.28 7.23    Hemoglobin 14.9 16.0    Hematocrit 44.8 47.2    Platelets 283 288    Total Cholesterol 204 (A)     Triglycerides 225 (A)     HDL Cholesterol 37 (A)     LDL Cholesterol  122 (A)     PSA 1.30     (A) Abnormal value       Comments are available for some flowsheets but are not being displayed.           Data reviewed: Radiologic studies US testicles           Assessment and Plan    Diagnoses and all orders for this visit:    1. Essential hypertension (Primary)  Assessment & Plan:  Hypertension is elevated in office today. .  Continue current treatment regimen.  Dietary sodium restriction.  Regular aerobic exercise.  Blood pressure will be reassessed at the next regular appointment. Keep BP log at home, notify if >135/85 consistently. Declines med increase at this time.     Orders:  -     Comprehensive Metabolic Panel; Future  -     CBC & Differential; Future    2. Allergic rhinitis, unspecified seasonality, unspecified trigger  Assessment & Plan:  Controlled   Continue zyrtec UAD   Recommend flonase       3. Asthma due to environmental allergies  Assessment & Plan:  Asthma is improving with treatment.  The patient is experiencing monthly daytime asthma symptoms. He is experiencing monthly nighttime asthma symptoms.  Follow up in 6 months, or sooner should new symptoms or problems arise.          4. Epididymal cyst  Comments:  Controlled  Declines futher tx  CTM, notify if pain, enlargment, or other change  occurs     5. Hyperlipidemia, unspecified hyperlipidemia type  Assessment & Plan:  Lipid abnormalities are improving with treatment.  Nutritional counseling was provided.  Lipids will be reassessed in 1 year.    Orders:  -     Lipid Panel; Future    6. Screening for thyroid disorder  -     TSH; Future    7. Screening for prostate cancer  -     PSA Screen; Future        Follow Up   Return in about 6 months (around 8/18/2022), or if symptoms worsen or fail to improve.  Patient was given instructions and counseling regarding his condition or for health maintenance advice. Please see specific information pulled into the AVS if appropriate.

## 2022-02-23 ENCOUNTER — TELEPHONE (OUTPATIENT)
Dept: FAMILY MEDICINE CLINIC | Facility: CLINIC | Age: 52
End: 2022-02-23

## 2022-02-23 NOTE — TELEPHONE ENCOUNTER
----- Message from OLVIN Chiu sent at 2/22/2022  8:55 AM EST -----  Cholesterol is elevated, not enough for medication. Bring down naturally with low cholesterol diet and regular exercise/activity.

## 2022-03-17 ENCOUNTER — OFFICE VISIT (OUTPATIENT)
Dept: FAMILY MEDICINE CLINIC | Facility: CLINIC | Age: 52
End: 2022-03-17

## 2022-03-17 VITALS
HEIGHT: 69 IN | OXYGEN SATURATION: 100 % | HEART RATE: 68 BPM | DIASTOLIC BLOOD PRESSURE: 94 MMHG | WEIGHT: 149.9 LBS | SYSTOLIC BLOOD PRESSURE: 155 MMHG | RESPIRATION RATE: 18 BRPM | TEMPERATURE: 97.1 F | BODY MASS INDEX: 22.2 KG/M2

## 2022-03-17 DIAGNOSIS — I10 ESSENTIAL HYPERTENSION: Primary | ICD-10-CM

## 2022-03-17 DIAGNOSIS — J45.909 ASTHMA DUE TO ENVIRONMENTAL ALLERGIES: ICD-10-CM

## 2022-03-17 DIAGNOSIS — Z91.018 MULTIPLE FOOD ALLERGIES: ICD-10-CM

## 2022-03-17 DIAGNOSIS — E78.5 HYPERLIPIDEMIA, UNSPECIFIED HYPERLIPIDEMIA TYPE: ICD-10-CM

## 2022-03-17 PROCEDURE — 99214 OFFICE O/P EST MOD 30 MIN: CPT | Performed by: NURSE PRACTITIONER

## 2022-03-17 NOTE — ASSESSMENT & PLAN NOTE
Hypertension is worsening.  Continue current treatment regimen.  Dietary sodium restriction.  Regular aerobic exercise.  Blood pressure will be reassessed in 3 months. Discussed tx options, pt declines increase in medication at this time. Will continue to monitor at home, keep log, and bring to next appt.

## 2022-03-17 NOTE — PROGRESS NOTES
"Chief Complaint   Patient presents with   • Hypertension   • Hyperlipidemia       Subjective          Colt Prieto presents to [facility]    Reviewed all recent labs and medications. Pt presents for FU htn. States his BP this morning was 109/77, 68 BPM. Todays BP in office 155/94. Taking lisinpril 10mg PO qd, was increased to 20mg but patient did not tolerate. Denies SOB, HA, blurred vision, dizziness, chest pain or other.       Past History:  Medical History: has a past medical history of Allergies, Arthritis, Asthma, Broken bones, Emphysema lung (HCC), Head injury, Sinus trouble, and SOB (shortness of breath).   Surgical History: has a past surgical history that includes Hip surgery (Right).   Family History: family history includes Brain cancer in an other family member; Heart disease in an other family member; Lung cancer in an other family member; Pancreatic cancer in an other family member; Stroke in an other family member.   Social History: reports that he has never smoked. He has never used smokeless tobacco. He reports that he does not drink alcohol and does not use drugs.  Allergies: Patient has no known allergies.  (Not in a hospital admission)       Social History     Socioeconomic History   • Marital status: Unknown   Tobacco Use   • Smoking status: Never Smoker   • Smokeless tobacco: Never Used   Vaping Use   • Vaping Use: Never used   Substance and Sexual Activity   • Alcohol use: Never   • Drug use: Never   • Sexual activity: Defer     Birth control/protection: None       There are no preventive care reminders to display for this patient.    Objective     Vital Signs:   /94   Pulse 68   Temp 97.1 °F (36.2 °C)   Resp 18   Ht 175.3 cm (69\")   Wt 68 kg (149 lb 14.4 oz)   SpO2 100%   BMI 22.14 kg/m²       Physical Exam  Vitals reviewed.   Constitutional:       General: He is not in acute distress.     Appearance: Normal appearance.   HENT:      Head: " Normocephalic.      Right Ear: Tympanic membrane normal.      Left Ear: Tympanic membrane normal.      Nose: Nose normal.      Mouth/Throat:      Pharynx: Oropharynx is clear. No posterior oropharyngeal erythema.   Eyes:      General: No scleral icterus.     Extraocular Movements: Extraocular movements intact.      Conjunctiva/sclera: Conjunctivae normal.      Pupils: Pupils are equal, round, and reactive to light.   Cardiovascular:      Rate and Rhythm: Normal rate and regular rhythm.      Pulses: Normal pulses.      Heart sounds: Normal heart sounds.   Pulmonary:      Effort: Pulmonary effort is normal.      Breath sounds: Normal breath sounds.   Abdominal:      General: Bowel sounds are normal.      Palpations: Abdomen is soft.   Musculoskeletal:         General: Normal range of motion.      Cervical back: Neck supple.   Skin:     General: Skin is warm and dry.   Neurological:      Mental Status: He is alert and oriented to person, place, and time.   Psychiatric:         Mood and Affect: Mood normal.         Behavior: Behavior normal.         Thought Content: Thought content normal.         Judgment: Judgment normal.          Review of Systems   Constitutional: Negative for chills, fatigue and fever.   HENT: Negative for congestion, ear pain, sinus pressure and sore throat.    Eyes: Negative for blurred vision.   Respiratory: Negative for cough and shortness of breath.    Cardiovascular: Negative for chest pain, palpitations and leg swelling.   Gastrointestinal: Negative for abdominal pain, constipation, diarrhea, nausea, vomiting and GERD.   Musculoskeletal: Negative for arthralgias.   Skin: Negative for rash and skin lesions.   Neurological: Negative for dizziness and headache.   Psychiatric/Behavioral: Negative for sleep disturbance, suicidal ideas and depressed mood. The patient is not nervous/anxious.         Result Review :     Common labs    Common Labsle 7/20/21 7/20/21 2/18/22 2/18/22 2/18/22 2/18/22     1500 1500 1000 1000 1000 1000   Glucose  86  84     BUN  9  10     Creatinine  0.89  0.89     eGFR Non African Am  91  91     Sodium  139  139     Potassium  4.3  4.1     Chloride  101  100     Calcium  9.6  9.3     Albumin  4.60  4.50     Total Bilirubin  0.4  0.4     Alkaline Phosphatase  117  106     AST (SGOT)  18  28     ALT (SGPT)  21  39     WBC 7.23  6.18      Hemoglobin 16.0  15.1      Hematocrit 47.2  43.8      Platelets 288  257      Total Cholesterol     222 (A)    Triglycerides     92    HDL Cholesterol     48    LDL Cholesterol      158 (A)    PSA      1.470   (A) Abnormal value                      Assessment and Plan    Diagnoses and all orders for this visit:    1. Essential hypertension (Primary)  Assessment & Plan:  Hypertension is worsening.  Continue current treatment regimen.  Dietary sodium restriction.  Regular aerobic exercise.  Blood pressure will be reassessed in 3 months. Discussed tx options, pt declines increase in medication at this time. Will continue to monitor at home, keep log, and bring to next appt.       2. Hyperlipidemia, unspecified hyperlipidemia type  Assessment & Plan:  Lipid abnormalities are improving with lifestyle modifications.  Nutritional counseling was provided.  Lipids will be reassessed in 3 months.      3. Multiple food allergies  Assessment & Plan:  Controlled      4. Asthma due to environmental allergies  Assessment & Plan:  Asthma is improving with treatment.  The patient is experiencing no daytime asthma symptoms. He is experiencing no nighttime asthma symptoms.  Continue medications as prescribed               Follow Up   Return in about 3 months (around 6/17/2022), or if symptoms worsen or fail to improve.  Patient was given instructions and counseling regarding his condition or for health maintenance advice. Please see specific information pulled into the AVS if appropriate.

## 2022-03-17 NOTE — ASSESSMENT & PLAN NOTE
Asthma is improving with treatment.  The patient is experiencing no daytime asthma symptoms. He is experiencing no nighttime asthma symptoms.  Continue medications as prescribed

## 2022-04-25 PROBLEM — R00.2 PALPITATIONS: Status: ACTIVE | Noted: 2022-04-25

## 2022-05-02 NOTE — PROGRESS NOTES
[facility]  Cardiology progress Note    Patient Name: Colt Prieto  : 1970    CHIEF COMPLAINT  Shortness of breath, palpitations.      Subjective   Subjective     HISTORY OF PRESENT ILLNESS    Colt Prieto is a 51 y.o. male with history of shortness of breath and palpitations.  Palpitations stable.  Shortness of breath is stable.    Review of Systems:   Constitutional no fever,  no weight loss   Skin no rash   Otolaryngeal no difficulty swallowing   Cardiovascular See HPI   Pulmonary no cough, no sputum production   Gastrointestinal no constipation, no diarrhea   Genitourinary no dysuria, no hematuria   Hematologic no easy bruisability, no abnormal bleeding   Musculoskeletal no muscle pain   Neurologic no dizziness, no falls         Personal History     Social History:  reports that he has never smoked. He has never used smokeless tobacco. He reports that he does not drink alcohol and does not use drugs.    Home Medications:  Current Outpatient Medications on File Prior to Visit   Medication Sig   • albuterol sulfate  (90 Base) MCG/ACT inhaler Inhale 2 puffs Every 4 (Four) Hours As Needed for Shortness of Air.   • budesonide-formoterol (SYMBICORT) 160-4.5 MCG/ACT inhaler Inhale 2 puffs 2 (Two) Times a Day.   • cetirizine (zyrTEC) 10 MG tablet Take 1 tablet by mouth Daily.   • lisinopril (PRINIVIL,ZESTRIL) 10 MG tablet Take 1 tablet by mouth Daily.     No current facility-administered medications on file prior to visit.     Allergies:  No Known Allergies    Objective    Objective       Vitals:   Heart Rate:  [78] 78  BP: (136)/(88) 136/88  Body mass index is 22.45 kg/m².     Physical Exam:   Constitutional: Awake, alert, No acute distress    Eyes: PERRLA, sclerae anicteric, no conjunctival injection   HENT: NCAT, mucous membranes moist   Neck: Supple, no thyromegaly, no lymphadenopathy, trachea midline   Respiratory: Clear to auscultation bilaterally, nonlabored respirations     Cardiovascular: RRR, no murmurs or rubs. Palpable pedal pulses bilaterally   Musculoskeletal: No bilateral ankle edema, no cyanosis to extremities   Psychiatric: Appropriate affect, cooperative   Neurologic: Oriented x 3, strength symmetric in all extremities, Cranial Nerves grossly intact to confrontation, speech clear   Skin: No rashes.    Result Review    Result Review:  I have personally reviewed the available results from  [x]  Laboratory  [x]  EKG  [x]  Cardiology  [x]  Medications  [x]  Old records  []  Other:   Procedures  Lab Results   Component Value Date    CHOL 222 (H) 02/18/2022     Lab Results   Component Value Date    TRIG 92 02/18/2022    TRIG 225 (H) 03/03/2021     Lab Results   Component Value Date    HDL 48 02/18/2022    HDL 37 (L) 03/03/2021     Lab Results   Component Value Date     (H) 02/18/2022     (H) 03/03/2021     Lab Results   Component Value Date    VLDL 16 02/18/2022    VLDL 45 (H) 03/03/2021        Impression/Plan:  1.  Stable shortness of breath: Probably secondary to asthma.  Echocardiogram shows normal left ventricular systolic function.  2.  Stable palpitations: No further palpitations.  3.  Hyperlipidemia: Lipid profile reviewed shows LDL of 158.  Low-fat diet and exercise advised.  If LDL is persistently high started on statins.  4.  Essential hypertension controlled: Continue lisinopril 10 mg a day.        Jose David Frazier MD   05/03/22   13:23 EDT

## 2022-05-03 ENCOUNTER — OFFICE VISIT (OUTPATIENT)
Dept: CARDIOLOGY | Facility: CLINIC | Age: 52
End: 2022-05-03

## 2022-05-03 VITALS
WEIGHT: 152 LBS | HEART RATE: 78 BPM | HEIGHT: 69 IN | SYSTOLIC BLOOD PRESSURE: 136 MMHG | DIASTOLIC BLOOD PRESSURE: 88 MMHG | BODY MASS INDEX: 22.51 KG/M2

## 2022-05-03 DIAGNOSIS — I10 HYPERTENSION, ESSENTIAL: ICD-10-CM

## 2022-05-03 DIAGNOSIS — R06.02 SHORTNESS OF BREATH: ICD-10-CM

## 2022-05-03 DIAGNOSIS — R00.2 PALPITATIONS: Primary | ICD-10-CM

## 2022-05-03 PROCEDURE — 99213 OFFICE O/P EST LOW 20 MIN: CPT | Performed by: SPECIALIST

## 2022-05-03 NOTE — PATIENT INSTRUCTIONS
Cholesterol Content in Foods  Cholesterol is a waxy, fat-like substance that helps to carry fat in the blood. The body needs cholesterol in small amounts, but too much cholesterol can cause damage to the arteries and heart.  Most people should eat less than 200 milligrams (mg) of cholesterol a day.  Foods with cholesterol    Cholesterol is found in animal-based foods, such as meat, seafood, and dairy. Generally, low-fat dairy and lean meats have less cholesterol than full-fat dairy and fatty meats. The milligrams of cholesterol per serving (mg per serving) of common cholesterol-containing foods are listed below.  Meat and other proteins  Egg -- one large whole egg has 186 mg.  Veal shank -- 4 oz has 141 mg.  Lean ground turkey (93% lean) -- 4 oz has 118 mg.  Fat-trimmed lamb loin -- 4 oz has 106 mg.  Lean ground beef (90% lean) -- 4 oz has 100 mg.  Lobster -- 3.5 oz has 90 mg.  Pork loin chops -- 4 oz has 86 mg.  Canned salmon -- 3.5 oz has 83 mg.  Fat-trimmed beef top loin -- 4 oz has 78 mg.  Frankfurter -- 1 keya (3.5 oz) has 77 mg.  Crab -- 3.5 oz has 71 mg.  Roasted chicken without skin, white meat -- 4 oz has 66 mg.  Light bologna -- 2 oz has 45 mg.  Deli-cut turkey -- 2 oz has 31 mg.  Canned tuna -- 3.5 oz has 31 mg.  Rosario -- 1 oz has 29 mg.  Oysters and mussels (raw) -- 3.5 oz has 25 mg.  Mackerel -- 1 oz has 22 mg.  Trout -- 1 oz has 20 mg.  Pork sausage -- 1 link (1 oz) has 17 mg.  [place] -- 1 oz has 16 mg.  Tilapia -- 1 oz has 14 mg.  Dairy  Soft-serve ice cream -- ½ cup (4 oz) has 103 mg.  Whole-milk yogurt -- 1 cup (8 oz) has 29 mg.  Cheddar cheese -- 1 oz has 28 mg.  American cheese -- 1 oz has 28 mg.  Whole milk -- 1 cup (8 oz) has 23 mg.  2% milk -- 1 cup (8 oz) has 18 mg.  Cream cheese -- 1 tablespoon (Tbsp) has 15 mg.  Cottage cheese -- ½ cup (4 oz) has 14 mg.  Low-fat (1%) milk -- 1 cup (8 oz) has 10 mg.  Sour cream -- 1 Tbsp has 8.5 mg.  Low-fat yogurt -- 1 cup (8 oz) has 8 mg.  Nonfat Greek  yogurt -- 1 cup (8 oz) has 7 mg.  Half-and-half cream -- 1 Tbsp has 5 mg.  Fats and oils  Cod liver oil -- 1 tablespoon (Tbsp) has 82 mg.  Butter -- 1 Tbsp has 15 mg.  Lard -- 1 Tbsp has 14 mg.  Rosario grease -- 1 Tbsp has 14 mg.  Mayonnaise -- 1 Tbsp has 5-10 mg.  Margarine -- 1 Tbsp has 3-10 mg.  Exact amounts of cholesterol in these foods may vary depending on specific ingredients and brands.  Foods without cholesterol  Most plant-based foods do not have cholesterol unless you combine them with a food that has cholesterol. Foods without cholesterol include:  Grains and cereals.  Vegetables.  Fruits.  Vegetable oils, such as olive, canola, and sunflower oil.  Legumes, such as peas, beans, and lentils.  Nuts and seeds.  Egg whites.  Summary  The body needs cholesterol in small amounts, but too much cholesterol can cause damage to the arteries and heart.  Most people should eat less than 200 milligrams (mg) of cholesterol a day.  This information is not intended to replace advice given to you by your health care provider. Make sure you discuss any questions you have with your health care provider.  Document Revised: 05/10/2021 Document Reviewed: 05/10/2021  [employer] Patient Education © 2021 [employer]

## 2022-06-17 ENCOUNTER — OFFICE VISIT (OUTPATIENT)
Dept: FAMILY MEDICINE CLINIC | Facility: CLINIC | Age: 52
End: 2022-06-17

## 2022-06-17 VITALS
OXYGEN SATURATION: 98 % | SYSTOLIC BLOOD PRESSURE: 139 MMHG | HEART RATE: 70 BPM | BODY MASS INDEX: 22.96 KG/M2 | WEIGHT: 155 LBS | DIASTOLIC BLOOD PRESSURE: 88 MMHG | RESPIRATION RATE: 18 BRPM | HEIGHT: 69 IN | TEMPERATURE: 98.2 F

## 2022-06-17 DIAGNOSIS — I10 ESSENTIAL HYPERTENSION: Primary | ICD-10-CM

## 2022-06-17 DIAGNOSIS — J45.909 ASTHMA DUE TO ENVIRONMENTAL ALLERGIES: ICD-10-CM

## 2022-06-17 DIAGNOSIS — E78.5 HYPERLIPIDEMIA, UNSPECIFIED HYPERLIPIDEMIA TYPE: ICD-10-CM

## 2022-06-17 DIAGNOSIS — J30.9 ALLERGIC RHINITIS, UNSPECIFIED SEASONALITY, UNSPECIFIED TRIGGER: ICD-10-CM

## 2022-06-17 PROBLEM — M19.90 ARTHRITIS: Status: ACTIVE | Noted: 2022-06-17

## 2022-06-17 PROBLEM — J43.9 PULMONARY EMPHYSEMA (HCC): Status: ACTIVE | Noted: 2022-06-17

## 2022-06-17 PROBLEM — J01.80 OTHER ACUTE SINUSITIS: Status: ACTIVE | Noted: 2022-06-17

## 2022-06-17 PROCEDURE — 99214 OFFICE O/P EST MOD 30 MIN: CPT | Performed by: NURSE PRACTITIONER

## 2022-06-17 RX ORDER — ALBUTEROL SULFATE 90 UG/1
2 AEROSOL, METERED RESPIRATORY (INHALATION) EVERY 4 HOURS PRN
Qty: 3 G | Refills: 3 | Status: SHIPPED | OUTPATIENT
Start: 2022-06-17

## 2022-06-17 RX ORDER — LISINOPRIL 10 MG/1
10 TABLET ORAL DAILY
Qty: 90 TABLET | Refills: 3 | Status: SHIPPED | OUTPATIENT
Start: 2022-06-17

## 2022-06-17 RX ORDER — CETIRIZINE HYDROCHLORIDE 10 MG/1
10 TABLET ORAL DAILY
Qty: 90 TABLET | Refills: 3 | Status: SHIPPED | OUTPATIENT
Start: 2022-06-17

## 2022-06-17 RX ORDER — BUDESONIDE AND FORMOTEROL FUMARATE DIHYDRATE 160; 4.5 UG/1; UG/1
2 AEROSOL RESPIRATORY (INHALATION)
Qty: 3 EACH | Refills: 3 | Status: SHIPPED | OUTPATIENT
Start: 2022-06-17

## 2022-06-17 NOTE — PROGRESS NOTES
"Chief Complaint  Hypertension (Has been controlled ), Hyperlipidemia, and Asthma (Has been having issues with the heat /)    Subjective        Colt Prieto presents to [facility]  Reviewed all recent labs and medications. Pt presents FU htn, hyperlipidemia, asthma. Recent visit with cardiology reviewed. Taking lisinopril 10mg PO qd faithfully. BP slightly elevated in office, however, pt states today's BP at home 122/70.     Reviewed all recent labs and medications.      Objective   Vital Signs:  /88   Pulse 70   Temp 98.2 °F (36.8 °C)   Resp 18   Ht 175.3 cm (69\")   Wt 70.3 kg (155 lb)   SpO2 98%   BMI 22.89 kg/m²   Estimated body mass index is 22.89 kg/m² as calculated from the following:    Height as of this encounter: 175.3 cm (69\").    Weight as of this encounter: 70.3 kg (155 lb).    BMI is within normal parameters. No other follow-up for BMI required.      Physical Exam  Vitals reviewed.   Constitutional:       General: He is not in acute distress.     Appearance: Normal appearance.   HENT:      Head: Normocephalic.      Right Ear: Tympanic membrane normal.      Left Ear: Tympanic membrane normal.      Nose: Nose normal.      Mouth/Throat:      Pharynx: Oropharynx is clear. No posterior oropharyngeal erythema.   Eyes:      General: No scleral icterus.     Extraocular Movements: Extraocular movements intact.      Conjunctiva/sclera: Conjunctivae normal.      Pupils: Pupils are equal, round, and reactive to light.   Cardiovascular:      Rate and Rhythm: Normal rate and regular rhythm.      Pulses: Normal pulses.      Heart sounds: Normal heart sounds.   Pulmonary:      Effort: Pulmonary effort is normal.      Breath sounds: Normal breath sounds.   Abdominal:      General: Bowel sounds are normal.      Palpations: Abdomen is soft.   Musculoskeletal:         General: Normal range of motion.      Cervical back: Neck supple.   Skin:     General: Skin is warm and dry. "   Neurological:      Mental Status: He is alert and oriented to person, place, and time.   Psychiatric:         Mood and Affect: Mood normal.         Behavior: Behavior normal.         Thought Content: Thought content normal.         Judgment: Judgment normal.        Result Review :    Common labs    Common Labsle 7/20/21 7/20/21 2/18/22 2/18/22 2/18/22 2/18/22    1500 1500 1000 1000 1000 1000   Glucose  86  84     BUN  9  10     Creatinine  0.89  0.89     eGFR Non African Am  91  91     Sodium  139  139     Potassium  4.3  4.1     Chloride  101  100     Calcium  9.6  9.3     Albumin  4.60  4.50     Total Bilirubin  0.4  0.4     Alkaline Phosphatase  117  106     AST (SGOT)  18  28     ALT (SGPT)  21  39     WBC 7.23  6.18      Hemoglobin 16.0  15.1      Hematocrit 47.2  43.8      Platelets 288  257      Total Cholesterol     222 (A)    Triglycerides     92    HDL Cholesterol     48    LDL Cholesterol      158 (A)    PSA      1.470   (A) Abnormal value            Data reviewed: Consultant notes cardiology          Assessment and Plan   Diagnoses and all orders for this visit:    1. Essential hypertension (Primary)  Assessment & Plan:  Hypertension is improving with treatment.  Continue current treatment regimen.  Dietary sodium restriction.  Regular aerobic exercise.  Blood pressure will be reassessed at the next regular appointment.      2. Hyperlipidemia, unspecified hyperlipidemia type  Assessment & Plan:  Lipid abnormalities are unchanged.  Nutritional counseling was provided.  Lipids will be reassessed in 3 months.      3. Allergic rhinitis, unspecified seasonality, unspecified trigger  Assessment & Plan:  Controlled  Continue zyrtec 10mg PO qd   Rec fluticasone use   Avoid allergy triggers       4. Asthma due to environmental allergies  Assessment & Plan:  Asthma is improving with treatment.  The patient is experiencing weekly daytime asthma symptoms. He is experiencing no nighttime asthma symptoms.  Counseled  to void exposure to cigarette smoke.  Discussed medication dosage, use, side effects, and goals of treatment in detail.                   Follow Up   Return in 3 months (on 9/17/2022), or if symptoms worsen or fail to improve.  Patient was given instructions and counseling regarding his condition or for health maintenance advice. Please see specific information pulled into the AVS if appropriate.

## 2022-06-17 NOTE — PATIENT INSTRUCTIONS
Blood Pressure Record Sheet  To take your blood pressure, you will need a blood pressure machine. You can buy a blood pressure machine (blood pressure monitor) at your clinic, drug store, or online. When choosing one, consider:  An automatic monitor that has an arm cuff.  A cuff that wraps snugly around your upper arm. You should be able to fit only one finger between your arm and the cuff.  A device that stores blood pressure reading results.  Do not choose a monitor that measures your blood pressure from your wrist or finger.  Follow your health care provider's instructions for how to take your blood pressure. To use this form:  Get one reading in the morning (a.m.) before you take any medicines.  Get one reading in the evening (p.m.) before supper.  Take at least 2 readings with each blood pressure check. This makes sure the results are correct. Wait 1-2 minutes between measurements.  Write down the results in the spaces on this form.  Repeat this once a week, or as told by your health care provider.  Make a follow-up appointment with your health care provider to discuss the results.  Blood pressure log  Date: _______________________  a.m. _____________________(1st reading) _____________________(2nd reading)  p.m. _____________________(1st reading) _____________________(2nd reading)  Date: _______________________  a.m. _____________________(1st reading) _____________________(2nd reading)  p.m. _____________________(1st reading) _____________________(2nd reading)  Date: _______________________  a.m. _____________________(1st reading) _____________________(2nd reading)  p.m. _____________________(1st reading) _____________________(2nd reading)  Date: _______________________  a.m. _____________________(1st reading) _____________________(2nd reading)  p.m. _____________________(1st reading) _____________________(2nd reading)  Date: _______________________  a.m. _____________________(1st reading)  _____________________(2nd reading)  p.m. _____________________(1st reading) _____________________(2nd reading)  This information is not intended to replace advice given to you by your health care provider. Make sure you discuss any questions you have with your health care provider.  Document Revised: 04/07/2021 Document Reviewed: 04/07/2021  [employer] Patient Education © 2021 [employer]

## 2022-06-17 NOTE — ASSESSMENT & PLAN NOTE
Asthma is improving with treatment.  The patient is experiencing weekly daytime asthma symptoms. He is experiencing no nighttime asthma symptoms.  Counseled to void exposure to cigarette smoke.  Discussed medication dosage, use, side effects, and goals of treatment in detail.

## 2022-07-28 ENCOUNTER — TELEPHONE (OUTPATIENT)
Dept: FAMILY MEDICINE CLINIC | Facility: CLINIC | Age: 52
End: 2022-07-28

## 2022-07-28 NOTE — TELEPHONE ENCOUNTER
Caller: BERTRAND SCHMIDT    Relationship to patient: Mother    Best call back number: [phone]    Patient is needing: BERTRAND STATED THAT THE PATIENT HAD ORTHOPEDIC SURGERY ON HIS RIGHT FEMUR BONE  ON 7/25/22 AT THE [facility]. CALLING TO SEE IF THERE ARE RECORDS ON FILE OR IF NEEDED FOR FUTURE REFERENCE.

## 2022-09-14 ENCOUNTER — LAB (OUTPATIENT)
Dept: LAB | Facility: HOSPITAL | Age: 52
End: 2022-09-14

## 2022-09-14 ENCOUNTER — OFFICE VISIT (OUTPATIENT)
Dept: FAMILY MEDICINE CLINIC | Facility: CLINIC | Age: 52
End: 2022-09-14

## 2022-09-14 VITALS
WEIGHT: 154.5 LBS | SYSTOLIC BLOOD PRESSURE: 147 MMHG | HEART RATE: 71 BPM | BODY MASS INDEX: 22.88 KG/M2 | OXYGEN SATURATION: 100 % | DIASTOLIC BLOOD PRESSURE: 89 MMHG | HEIGHT: 69 IN

## 2022-09-14 DIAGNOSIS — E87.6 HYPOKALEMIA: ICD-10-CM

## 2022-09-14 DIAGNOSIS — D64.9 ANEMIA, UNSPECIFIED TYPE: ICD-10-CM

## 2022-09-14 DIAGNOSIS — Z11.52 ENCOUNTER FOR SCREENING FOR COVID-19: Primary | ICD-10-CM

## 2022-09-14 DIAGNOSIS — E78.5 HYPERLIPIDEMIA, UNSPECIFIED HYPERLIPIDEMIA TYPE: ICD-10-CM

## 2022-09-14 DIAGNOSIS — J30.9 ALLERGIC RHINITIS, UNSPECIFIED SEASONALITY, UNSPECIFIED TRIGGER: ICD-10-CM

## 2022-09-14 DIAGNOSIS — I10 ESSENTIAL HYPERTENSION: ICD-10-CM

## 2022-09-14 LAB
ALBUMIN SERPL-MCNC: 4.7 G/DL (ref 3.5–5.2)
ALBUMIN/GLOB SERPL: 2.4 G/DL
ALP SERPL-CCNC: 168 U/L (ref 39–117)
ALT SERPL W P-5'-P-CCNC: 40 U/L (ref 1–41)
ANION GAP SERPL CALCULATED.3IONS-SCNC: 10 MMOL/L (ref 5–15)
AST SERPL-CCNC: 26 U/L (ref 1–40)
BASOPHILS # BLD AUTO: 0.07 10*3/MM3 (ref 0–0.2)
BASOPHILS NFR BLD AUTO: 1.2 % (ref 0–1.5)
BILIRUB SERPL-MCNC: 0.6 MG/DL (ref 0–1.2)
BUN SERPL-MCNC: 10 MG/DL (ref 6–20)
BUN/CREAT SERPL: 9.9 (ref 7–25)
CALCIUM SPEC-SCNC: 9.7 MG/DL (ref 8.6–10.5)
CHLORIDE SERPL-SCNC: 100 MMOL/L (ref 98–107)
CHOLEST SERPL-MCNC: 245 MG/DL (ref 0–200)
CO2 SERPL-SCNC: 28 MMOL/L (ref 22–29)
CREAT SERPL-MCNC: 1.01 MG/DL (ref 0.76–1.27)
DEPRECATED RDW RBC AUTO: 42.6 FL (ref 37–54)
EGFRCR SERPLBLD CKD-EPI 2021: 90.6 ML/MIN/1.73
EOSINOPHIL # BLD AUTO: 0.32 10*3/MM3 (ref 0–0.4)
EOSINOPHIL NFR BLD AUTO: 5.6 % (ref 0.3–6.2)
ERYTHROCYTE [DISTWIDTH] IN BLOOD BY AUTOMATED COUNT: 12.6 % (ref 12.3–15.4)
GLOBULIN UR ELPH-MCNC: 2 GM/DL
GLUCOSE SERPL-MCNC: 93 MG/DL (ref 65–99)
HCT VFR BLD AUTO: 44.5 % (ref 37.5–51)
HDLC SERPL-MCNC: 45 MG/DL (ref 40–60)
HGB BLD-MCNC: 14.4 G/DL (ref 13–17.7)
IMM GRANULOCYTES # BLD AUTO: 0.02 10*3/MM3 (ref 0–0.05)
IMM GRANULOCYTES NFR BLD AUTO: 0.4 % (ref 0–0.5)
IRON 24H UR-MRATE: 92 MCG/DL (ref 59–158)
IRON SATN MFR SERPL: 23 % (ref 20–50)
LDLC SERPL CALC-MCNC: 178 MG/DL (ref 0–100)
LDLC/HDLC SERPL: 3.89 {RATIO}
LYMPHOCYTES # BLD AUTO: 1.08 10*3/MM3 (ref 0.7–3.1)
LYMPHOCYTES NFR BLD AUTO: 19 % (ref 19.6–45.3)
MCH RBC QN AUTO: 29.7 PG (ref 26.6–33)
MCHC RBC AUTO-ENTMCNC: 32.4 G/DL (ref 31.5–35.7)
MCV RBC AUTO: 91.8 FL (ref 79–97)
MONOCYTES # BLD AUTO: 0.56 10*3/MM3 (ref 0.1–0.9)
MONOCYTES NFR BLD AUTO: 9.8 % (ref 5–12)
NEUTROPHILS NFR BLD AUTO: 3.64 10*3/MM3 (ref 1.7–7)
NEUTROPHILS NFR BLD AUTO: 64 % (ref 42.7–76)
NRBC BLD AUTO-RTO: 0 /100 WBC (ref 0–0.2)
PLATELET # BLD AUTO: 298 10*3/MM3 (ref 140–450)
PMV BLD AUTO: 11 FL (ref 6–12)
POTASSIUM SERPL-SCNC: 4.4 MMOL/L (ref 3.5–5.2)
PROT SERPL-MCNC: 6.7 G/DL (ref 6–8.5)
RBC # BLD AUTO: 4.85 10*6/MM3 (ref 4.14–5.8)
SARS-COV-2 RNA PNL SPEC NAA+PROBE: NOT DETECTED
SODIUM SERPL-SCNC: 138 MMOL/L (ref 136–145)
TIBC SERPL-MCNC: 402 MCG/DL (ref 298–536)
TRANSFERRIN SERPL-MCNC: 270 MG/DL (ref 200–360)
TRIGL SERPL-MCNC: 124 MG/DL (ref 0–150)
VLDLC SERPL-MCNC: 22 MG/DL (ref 5–40)
WBC NRBC COR # BLD: 5.69 10*3/MM3 (ref 3.4–10.8)

## 2022-09-14 PROCEDURE — U0004 COV-19 TEST NON-CDC HGH THRU: HCPCS | Performed by: NURSE PRACTITIONER

## 2022-09-14 PROCEDURE — 80053 COMPREHEN METABOLIC PANEL: CPT

## 2022-09-14 PROCEDURE — 80061 LIPID PANEL: CPT

## 2022-09-14 PROCEDURE — 84466 ASSAY OF TRANSFERRIN: CPT

## 2022-09-14 PROCEDURE — 36415 COLL VENOUS BLD VENIPUNCTURE: CPT

## 2022-09-14 PROCEDURE — 99214 OFFICE O/P EST MOD 30 MIN: CPT | Performed by: NURSE PRACTITIONER

## 2022-09-14 PROCEDURE — 85025 COMPLETE CBC W/AUTO DIFF WBC: CPT

## 2022-09-14 PROCEDURE — 83540 ASSAY OF IRON: CPT

## 2022-09-14 RX ORDER — CALCIUM CARBONATE 500(1250)
TABLET ORAL
COMMUNITY
Start: 2022-08-24

## 2022-09-14 RX ORDER — TRAMADOL HYDROCHLORIDE 50 MG/1
TABLET ORAL
COMMUNITY
Start: 2022-07-26 | End: 2022-09-14

## 2022-09-14 RX ORDER — CYCLOBENZAPRINE HCL 10 MG
TABLET ORAL
COMMUNITY
Start: 2022-07-26

## 2022-09-14 RX ORDER — DOCUSATE SODIUM 50 MG AND SENNOSIDES 8.6 MG 8.6; 5 MG/1; MG/1
TABLET, FILM COATED ORAL
COMMUNITY
Start: 2022-07-26 | End: 2022-09-14

## 2022-09-14 RX ORDER — IBUPROFEN 400 MG/1
TABLET ORAL
COMMUNITY
Start: 2022-07-26 | End: 2022-09-14

## 2022-09-14 RX ORDER — ENOXAPARIN SODIUM 100 MG/ML
INJECTION SUBCUTANEOUS
COMMUNITY
Start: 2022-07-27

## 2022-09-14 RX ORDER — POLYETHYLENE GLYCOL 3350 17 G/17G
POWDER, FOR SOLUTION ORAL
COMMUNITY
Start: 2022-07-26 | End: 2022-09-14

## 2022-09-14 NOTE — PROGRESS NOTES
"Chief Complaint  Follow-up (Hypertension,pt brought bp log from home )    Subjective        Colt Prieto presents to [facility]  Reviewed all recent labs and medications. Pt presents FU. Pt with hypertension. Today's /89, however he brought BP log and readings are low 100's/60's consistently. Pt states he is taking BP medication faithfully. Denies any issues with HA, blurred vision, chest pain, SOB, or other.  Pt has been to cardiology in the past. Pt with sx at [facility] July 2022 femur fx, had to remove old hardware and install new. Will redraw abn labs today.       Objective   Vital Signs:  /89   Pulse 71   Ht 175.3 cm (69\")   Wt 70.1 kg (154 lb 8 oz)   SpO2 100%   BMI 22.82 kg/m²   Estimated body mass index is 22.82 kg/m² as calculated from the following:    Height as of this encounter: 175.3 cm (69\").    Weight as of this encounter: 70.1 kg (154 lb 8 oz).    BMI is within normal parameters. No other follow-up for BMI required.      Physical Exam  Vitals reviewed.   Constitutional:       General: He is not in acute distress.     Appearance: Normal appearance.   HENT:      Head: Normocephalic.      Right Ear: Tympanic membrane normal.      Left Ear: Tympanic membrane normal.      Nose: Nose normal.      Mouth/Throat:      Pharynx: Oropharynx is clear. No posterior oropharyngeal erythema.   Eyes:      General: No scleral icterus.     Extraocular Movements: Extraocular movements intact.      Conjunctiva/sclera: Conjunctivae normal.      Pupils: Pupils are equal, round, and reactive to light.   Cardiovascular:      Rate and Rhythm: Normal rate and regular rhythm.      Pulses: Normal pulses.      Heart sounds: Normal heart sounds.   Pulmonary:      Effort: Pulmonary effort is normal.      Breath sounds: Normal breath sounds.   Abdominal:      General: Bowel sounds are normal.      Palpations: Abdomen is soft.   Musculoskeletal:         General: Normal range of motion.    "   Cervical back: Neck supple.   Skin:     General: Skin is warm and dry.   Neurological:      Mental Status: He is alert and oriented to person, place, and time.   Psychiatric:         Mood and Affect: Mood normal.         Behavior: Behavior normal.         Thought Content: Thought content normal.         Judgment: Judgment normal.        Result Review :    Common labs    Common Labsle 2/18/22 2/18/22 2/18/22 2/18/22    1000 1000 1000 1000   Glucose  84     BUN  10     Creatinine  0.89     eGFR Non African Am  91     Sodium  139     Potassium  4.1     Chloride  100     Calcium  9.3     Albumin  4.50     Total Bilirubin  0.4     Alkaline Phosphatase  106     AST (SGOT)  28     ALT (SGPT)  39     WBC 6.18      Hemoglobin 15.1      Hematocrit 43.8      Platelets 257      Total Cholesterol   222 (A)    Triglycerides   92    HDL Cholesterol   48    LDL Cholesterol    158 (A)    PSA    1.470   (A) Abnormal value                      Assessment and Plan   Diagnoses and all orders for this visit:    1. Essential hypertension (Primary)  Assessment & Plan:  Hypertension is unchanged.  Continue current treatment regimen.  Dietary sodium restriction.  Regular aerobic exercise.  Ambulatory blood pressure monitoring.  Blood pressure will be reassessed at the next regular appointment. Pt is going bring cuff in for BP check. Keep FU with cardiology.     Orders:  -     Comprehensive Metabolic Panel; Future  -     CBC & Differential; Future    2. Allergic rhinitis, unspecified seasonality, unspecified trigger    3. Hyperlipidemia, unspecified hyperlipidemia type  Assessment & Plan:  Lipid abnormalities are recheck today .  Nutritional counseling was provided.  Lipids will be reassessed in 6 months.    Orders:  -     Lipid Panel; Future    4. Hypokalemia  Assessment & Plan:  Likely d/t sx, will recheck today     Orders:  -     Comprehensive Metabolic Panel; Future  -     CBC & Differential; Future    5. Anemia, unspecified  type  Assessment & Plan:  Likely d/t sx  Recheck today   Iron rich diet     Orders:  -     Iron and TIBC; Future           Follow Up   Return if symptoms worsen or fail to improve.  Patient was given instructions and counseling regarding his condition or for health maintenance advice. Please see specific information pulled into the AVS if appropriate.

## 2022-09-14 NOTE — ASSESSMENT & PLAN NOTE
Hypertension is unchanged.  Continue current treatment regimen.  Dietary sodium restriction.  Regular aerobic exercise.  Ambulatory blood pressure monitoring.  Blood pressure will be reassessed at the next regular appointment. Pt is going bring cuff in for BP check. Keep FU with cardiology.

## 2022-09-20 DIAGNOSIS — E78.5 HYPERLIPIDEMIA, UNSPECIFIED HYPERLIPIDEMIA TYPE: Primary | ICD-10-CM

## 2022-09-20 RX ORDER — ATORVASTATIN CALCIUM 10 MG/1
10 TABLET, FILM COATED ORAL DAILY
Qty: 90 TABLET | Refills: 1 | Status: SHIPPED | OUTPATIENT
Start: 2022-09-20

## 2023-05-03 NOTE — PROGRESS NOTES
[facility]  Cardiology progress Note    Patient Name: Colt Prieto  : 1970    CHIEF COMPLAINT  Palpitations        Subjective   Subjective     HISTORY OF PRESENT ILLNESS    Colt Prieto is a 52 y.o. male with history of palpitations hypertension.  No further palpitations.    REVIEW OF SYSTEMS    Constitutional:    No fever, no weight loss  Skin:     No rash  Otolaryngeal:    No difficulty swallowing  Cardiovascular: See HPI.  Pulmonary:    No cough, no sputum production    Personal History     Social History:    reports that he has never smoked. He has never used smokeless tobacco. He reports that he does not drink alcohol and does not use drugs.    Home Medications:  Current Outpatient Medications on File Prior to Visit   Medication Sig   • albuterol sulfate  (90 Base) MCG/ACT inhaler Inhale 2 puffs Every 4 (Four) Hours As Needed for Shortness of Air.   • budesonide-formoterol (SYMBICORT) 160-4.5 MCG/ACT inhaler Inhale 2 puffs 2 (Two) Times a Day.   • calcium carbonate, oyster shell, 500 MG tablet tablet    • cetirizine (zyrTEC) 10 MG tablet Take 1 tablet by mouth Daily.   • Cholecalciferol 1.25 MG (02247 UT) tablet    • cyclobenzaprine (FLEXERIL) 10 MG tablet    • Enoxaparin Sodium (LOVENOX) 40 MG/0.4ML solution prefilled syringe syringe    • lisinopril (PRINIVIL,ZESTRIL) 10 MG tablet Take 1 tablet by mouth Daily.   • [DISCONTINUED] atorvastatin (LIPITOR) 10 MG tablet Take 1 tablet by mouth Daily.     No current facility-administered medications on file prior to visit.       Past Medical History:   Diagnosis Date   • Allergies    • Arthritis    • Asthma    • Broken bones    • Emphysema lung    • Head injury    • Sinus trouble    • SOB (shortness of breath)        Allergies:  Allergies   Allergen Reactions   • Acetaminophen Unknown - Low Severity       Objective    Objective       Vitals:   Heart Rate:  [76] 76  BP: (134)/(90) 134/90  Body mass index is 24.37 kg/m².     PHYSICAL  EXAM:    General Appearance:   · well developed  · well nourished  HENT:   · oropharynx moist  · lips not cyanotic  Neck:  · thyroid not enlarged  · supple  Respiratory:  · no respiratory distress  · normal breath sounds  · no rales  Cardiovascular:  · no jugular venous distention  · regular rhythm  · apical impulse normal  · S1 normal, S2 normal  · no S3, no S4   · no murmur  · no rub, no thrill  · carotid pulses normal; no bruit  · pedal pulses normal  · lower extremity edema: none    Skin:   · warm, dry  Psychiatric:  · judgement and insight appropriate  · normal mood and affect        Result Review:  I have personally reviewed the available results from  [x]  Laboratory  [x]  EKG  [x]  Cardiology  [x]  Medications  [x]  Old records  []  Other:     Procedures  Lab Results   Component Value Date    CHOL 245 (H) 09/14/2022    CHOL 222 (H) 02/18/2022     Lab Results   Component Value Date    TRIG 124 09/14/2022    TRIG 92 02/18/2022    TRIG 225 (H) 03/03/2021     Lab Results   Component Value Date    HDL 45 09/14/2022    HDL 48 02/18/2022    HDL 37 (L) 03/03/2021     Lab Results   Component Value Date     (H) 09/14/2022     (H) 02/18/2022     (H) 03/03/2021     Lab Results   Component Value Date    VLDL 22 09/14/2022    VLDL 16 02/18/2022    VLDL 45 (H) 03/03/2021        Impression/Plan:  1.  Essential hypertension controlled: Continue lisinopril 10 mg once a day.  Monitor blood pressure regularly.  2.  Stable palpitations.  No further palpitations.  3.  Hyperlipidemia: Start Lipitor 10 mg once a day.  Monitor lipid and hepatic profile.           Jose David Frazier MD   05/04/23   13:12 EDT

## 2023-05-04 ENCOUNTER — OFFICE VISIT (OUTPATIENT)
Dept: CARDIOLOGY | Facility: CLINIC | Age: 53
End: 2023-05-04
Payer: MEDICAID

## 2023-05-04 VITALS
HEART RATE: 76 BPM | WEIGHT: 165 LBS | DIASTOLIC BLOOD PRESSURE: 90 MMHG | BODY MASS INDEX: 24.44 KG/M2 | SYSTOLIC BLOOD PRESSURE: 134 MMHG | HEIGHT: 69 IN

## 2023-05-04 DIAGNOSIS — R00.2 PALPITATIONS: Primary | ICD-10-CM

## 2023-05-04 DIAGNOSIS — E78.2 HYPERLIPEMIA, MIXED: ICD-10-CM

## 2023-05-04 DIAGNOSIS — I10 HYPERTENSION, ESSENTIAL: ICD-10-CM

## 2023-05-04 PROCEDURE — 3075F SYST BP GE 130 - 139MM HG: CPT | Performed by: SPECIALIST

## 2023-05-04 PROCEDURE — 3080F DIAST BP >= 90 MM HG: CPT | Performed by: SPECIALIST

## 2023-05-04 PROCEDURE — 1159F MED LIST DOCD IN RCRD: CPT | Performed by: SPECIALIST

## 2023-05-04 PROCEDURE — 99213 OFFICE O/P EST LOW 20 MIN: CPT | Performed by: SPECIALIST

## 2023-05-04 PROCEDURE — 1160F RVW MEDS BY RX/DR IN RCRD: CPT | Performed by: SPECIALIST

## 2023-05-04 RX ORDER — ATORVASTATIN CALCIUM 10 MG/1
10 TABLET, FILM COATED ORAL DAILY
Qty: 30 TABLET | Refills: 1 | Status: SHIPPED | OUTPATIENT
Start: 2023-05-04

## 2023-09-05 NOTE — PATIENT INSTRUCTIONS
Blood Pressure Record Sheet  To take your blood pressure, you will need a blood pressure machine. You can buy a blood pressure machine (blood pressure monitor) at your clinic, drug store, or online. When choosing one, consider:  An automatic monitor that has an arm cuff.  A cuff that wraps snugly around your upper arm. You should be able to fit only one finger between your arm and the cuff.  A device that stores blood pressure reading results.  Do not choose a monitor that measures your blood pressure from your wrist or finger.  Follow your health care provider's instructions for how to take your blood pressure. To use this form:  Get one reading in the morning (a.m.) before you take any medicines.  Get one reading in the evening (p.m.) before supper.  Take at least 2 readings with each blood pressure check. This makes sure the results are correct. Wait 1-2 minutes between measurements.  Write down the results in the spaces on this form.  Repeat this once a week, or as told by your health care provider.  Make a follow-up appointment with your health care provider to discuss the results.  Blood pressure log  Date: _______________________  a.m. _____________________(1st reading) _____________________(2nd reading)  p.m. _____________________(1st reading) _____________________(2nd reading)  Date: _______________________  a.m. _____________________(1st reading) _____________________(2nd reading)  p.m. _____________________(1st reading) _____________________(2nd reading)  Date: _______________________  a.m. _____________________(1st reading) _____________________(2nd reading)  p.m. _____________________(1st reading) _____________________(2nd reading)  Date: _______________________  a.m. _____________________(1st reading) _____________________(2nd reading)  p.m. _____________________(1st reading) _____________________(2nd reading)  Date: _______________________  a.m. _____________________(1st reading)  _____________________(2nd reading)  p.m. _____________________(1st reading) _____________________(2nd reading)  This information is not intended to replace advice given to you by your health care provider. Make sure you discuss any questions you have with your health care provider.  Document Revised: 04/07/2021 Document Reviewed: 04/07/2021  Elsevier Patient Education © 2021 [employer]     Alert and oriented to person, place and time

## 2024-05-07 ENCOUNTER — OFFICE VISIT (OUTPATIENT)
Dept: CARDIOLOGY | Facility: CLINIC | Age: 54
End: 2024-05-07
Payer: MEDICAID

## 2024-05-07 VITALS
DIASTOLIC BLOOD PRESSURE: 92 MMHG | WEIGHT: 165 LBS | HEART RATE: 80 BPM | HEIGHT: 69 IN | SYSTOLIC BLOOD PRESSURE: 136 MMHG | BODY MASS INDEX: 24.44 KG/M2

## 2024-05-07 DIAGNOSIS — E78.2 HYPERLIPEMIA, MIXED: Primary | ICD-10-CM

## 2024-05-07 DIAGNOSIS — I10 HYPERTENSION, ESSENTIAL: ICD-10-CM

## 2024-05-07 DIAGNOSIS — R00.2 PALPITATIONS: ICD-10-CM

## 2024-05-07 PROCEDURE — 1159F MED LIST DOCD IN RCRD: CPT | Performed by: SPECIALIST

## 2024-05-07 PROCEDURE — 3075F SYST BP GE 130 - 139MM HG: CPT | Performed by: SPECIALIST

## 2024-05-07 PROCEDURE — 3080F DIAST BP >= 90 MM HG: CPT | Performed by: SPECIALIST

## 2024-05-07 PROCEDURE — 99214 OFFICE O/P EST MOD 30 MIN: CPT | Performed by: SPECIALIST

## 2024-05-07 PROCEDURE — 1160F RVW MEDS BY RX/DR IN RCRD: CPT | Performed by: SPECIALIST

## 2024-05-07 RX ORDER — LISINOPRIL 10 MG/1
10 TABLET ORAL DAILY
Qty: 90 TABLET | Refills: 3 | Status: SHIPPED | OUTPATIENT
Start: 2024-05-07

## 2024-05-07 RX ORDER — ATORVASTATIN CALCIUM 10 MG/1
10 TABLET, FILM COATED ORAL DAILY
Qty: 90 TABLET | Refills: 3 | Status: SHIPPED | OUTPATIENT
Start: 2024-05-07

## 2024-11-01 RX ORDER — BUDESONIDE AND FORMOTEROL FUMARATE DIHYDRATE 160; 4.5 UG/1; UG/1
AEROSOL RESPIRATORY (INHALATION)
Qty: 10.2 G | Refills: 3 | Status: SHIPPED | OUTPATIENT
Start: 2024-11-01

## 2024-11-01 NOTE — TELEPHONE ENCOUNTER
Called and spoke to pt and scheduled for his yearly next week. Confirmed he was still our pt. Didn't know if you wanted to refill this or wait

## 2024-11-06 ENCOUNTER — LAB (OUTPATIENT)
Dept: LAB | Facility: HOSPITAL | Age: 54
End: 2024-11-06
Payer: MEDICAID

## 2024-11-06 ENCOUNTER — OFFICE VISIT (OUTPATIENT)
Dept: FAMILY MEDICINE CLINIC | Facility: CLINIC | Age: 54
End: 2024-11-06
Payer: MEDICAID

## 2024-11-06 VITALS
OXYGEN SATURATION: 100 % | TEMPERATURE: 98.7 F | HEART RATE: 68 BPM | DIASTOLIC BLOOD PRESSURE: 81 MMHG | SYSTOLIC BLOOD PRESSURE: 130 MMHG | WEIGHT: 160 LBS | RESPIRATION RATE: 18 BRPM | BODY MASS INDEX: 23.7 KG/M2 | HEIGHT: 69 IN

## 2024-11-06 DIAGNOSIS — E78.2 HYPERLIPEMIA, MIXED: ICD-10-CM

## 2024-11-06 DIAGNOSIS — I10 ESSENTIAL HYPERTENSION: ICD-10-CM

## 2024-11-06 DIAGNOSIS — Z00.00 ANNUAL PHYSICAL EXAM: ICD-10-CM

## 2024-11-06 DIAGNOSIS — Z00.00 ANNUAL PHYSICAL EXAM: Primary | ICD-10-CM

## 2024-11-06 DIAGNOSIS — Z11.59 NEED FOR HEPATITIS C SCREENING TEST: ICD-10-CM

## 2024-11-06 DIAGNOSIS — Z12.5 SCREENING FOR PROSTATE CANCER: ICD-10-CM

## 2024-11-06 DIAGNOSIS — E78.5 HYPERLIPIDEMIA, UNSPECIFIED HYPERLIPIDEMIA TYPE: ICD-10-CM

## 2024-11-06 DIAGNOSIS — Z13.29 SCREENING FOR THYROID DISORDER: ICD-10-CM

## 2024-11-06 PROBLEM — J45.902 ASTHMA WITH STATUS ASTHMATICUS: Status: RESOLVED | Noted: 2021-07-12 | Resolved: 2024-11-06

## 2024-11-06 PROBLEM — R06.02 SHORTNESS OF BREATH: Status: RESOLVED | Noted: 2021-07-12 | Resolved: 2024-11-06

## 2024-11-06 PROBLEM — R05.9 COUGH: Status: RESOLVED | Noted: 2021-07-12 | Resolved: 2024-11-06

## 2024-11-06 PROBLEM — J01.80 OTHER ACUTE SINUSITIS: Status: RESOLVED | Noted: 2022-06-17 | Resolved: 2024-11-06

## 2024-11-06 LAB
ALBUMIN SERPL-MCNC: 4.6 G/DL (ref 3.5–5.2)
ALBUMIN/GLOB SERPL: 1.8 G/DL
ALP SERPL-CCNC: 104 U/L (ref 39–117)
ALT SERPL W P-5'-P-CCNC: 28 U/L (ref 1–41)
ANION GAP SERPL CALCULATED.3IONS-SCNC: 10 MMOL/L (ref 5–15)
AST SERPL-CCNC: 28 U/L (ref 1–40)
BASOPHILS # BLD AUTO: 0.06 10*3/MM3 (ref 0–0.2)
BASOPHILS NFR BLD AUTO: 1.2 % (ref 0–1.5)
BILIRUB CONJ SERPL-MCNC: 0.2 MG/DL (ref 0–0.3)
BILIRUB SERPL-MCNC: 0.6 MG/DL (ref 0–1.2)
BUN SERPL-MCNC: 9 MG/DL (ref 6–20)
BUN/CREAT SERPL: 8.8 (ref 7–25)
CALCIUM SPEC-SCNC: 9.4 MG/DL (ref 8.6–10.5)
CHLORIDE SERPL-SCNC: 101 MMOL/L (ref 98–107)
CHOLEST SERPL-MCNC: 214 MG/DL (ref 0–200)
CO2 SERPL-SCNC: 26 MMOL/L (ref 22–29)
CREAT SERPL-MCNC: 1.02 MG/DL (ref 0.76–1.27)
DEPRECATED RDW RBC AUTO: 38.9 FL (ref 37–54)
EGFRCR SERPLBLD CKD-EPI 2021: 88.4 ML/MIN/1.73
EOSINOPHIL # BLD AUTO: 0.18 10*3/MM3 (ref 0–0.4)
EOSINOPHIL NFR BLD AUTO: 3.7 % (ref 0.3–6.2)
ERYTHROCYTE [DISTWIDTH] IN BLOOD BY AUTOMATED COUNT: 11.8 % (ref 12.3–15.4)
GLOBULIN UR ELPH-MCNC: 2.5 GM/DL
GLUCOSE SERPL-MCNC: 68 MG/DL (ref 65–99)
HCT VFR BLD AUTO: 45.4 % (ref 37.5–51)
HCV AB SER QL: NORMAL
HDLC SERPL-MCNC: 33 MG/DL (ref 40–60)
HGB BLD-MCNC: 15.4 G/DL (ref 13–17.7)
IMM GRANULOCYTES # BLD AUTO: 0.01 10*3/MM3 (ref 0–0.05)
IMM GRANULOCYTES NFR BLD AUTO: 0.2 % (ref 0–0.5)
LDLC SERPL CALC-MCNC: 154 MG/DL (ref 0–100)
LDLC/HDLC SERPL: 4.59 {RATIO}
LYMPHOCYTES # BLD AUTO: 1.33 10*3/MM3 (ref 0.7–3.1)
LYMPHOCYTES NFR BLD AUTO: 27.2 % (ref 19.6–45.3)
MCH RBC QN AUTO: 30.6 PG (ref 26.6–33)
MCHC RBC AUTO-ENTMCNC: 33.9 G/DL (ref 31.5–35.7)
MCV RBC AUTO: 90.1 FL (ref 79–97)
MONOCYTES # BLD AUTO: 0.43 10*3/MM3 (ref 0.1–0.9)
MONOCYTES NFR BLD AUTO: 8.8 % (ref 5–12)
NEUTROPHILS NFR BLD AUTO: 2.88 10*3/MM3 (ref 1.7–7)
NEUTROPHILS NFR BLD AUTO: 58.9 % (ref 42.7–76)
NRBC BLD AUTO-RTO: 0 /100 WBC (ref 0–0.2)
PLATELET # BLD AUTO: 294 10*3/MM3 (ref 140–450)
PMV BLD AUTO: 11 FL (ref 6–12)
POTASSIUM SERPL-SCNC: 4.2 MMOL/L (ref 3.5–5.2)
PROT SERPL-MCNC: 7.1 G/DL (ref 6–8.5)
PSA SERPL-MCNC: 1.44 NG/ML (ref 0–4)
RBC # BLD AUTO: 5.04 10*6/MM3 (ref 4.14–5.8)
SODIUM SERPL-SCNC: 137 MMOL/L (ref 136–145)
TRIGL SERPL-MCNC: 147 MG/DL (ref 0–150)
TSH SERPL DL<=0.05 MIU/L-ACNC: 1.78 UIU/ML (ref 0.27–4.2)
VLDLC SERPL-MCNC: 27 MG/DL (ref 5–40)
WBC NRBC COR # BLD AUTO: 4.89 10*3/MM3 (ref 3.4–10.8)

## 2024-11-06 PROCEDURE — 1159F MED LIST DOCD IN RCRD: CPT | Performed by: NURSE PRACTITIONER

## 2024-11-06 PROCEDURE — 82248 BILIRUBIN DIRECT: CPT

## 2024-11-06 PROCEDURE — 80050 GENERAL HEALTH PANEL: CPT

## 2024-11-06 PROCEDURE — 80061 LIPID PANEL: CPT

## 2024-11-06 PROCEDURE — 1126F AMNT PAIN NOTED NONE PRSNT: CPT | Performed by: NURSE PRACTITIONER

## 2024-11-06 PROCEDURE — 86803 HEPATITIS C AB TEST: CPT

## 2024-11-06 PROCEDURE — G0103 PSA SCREENING: HCPCS

## 2024-11-06 PROCEDURE — 36415 COLL VENOUS BLD VENIPUNCTURE: CPT

## 2024-11-06 PROCEDURE — 3075F SYST BP GE 130 - 139MM HG: CPT | Performed by: NURSE PRACTITIONER

## 2024-11-06 PROCEDURE — 99396 PREV VISIT EST AGE 40-64: CPT | Performed by: NURSE PRACTITIONER

## 2024-11-06 PROCEDURE — 3079F DIAST BP 80-89 MM HG: CPT | Performed by: NURSE PRACTITIONER

## 2024-11-06 PROCEDURE — 1160F RVW MEDS BY RX/DR IN RCRD: CPT | Performed by: NURSE PRACTITIONER

## 2024-11-06 RX ORDER — ALBUTEROL SULFATE 90 UG/1
2 AEROSOL, METERED RESPIRATORY (INHALATION)
Qty: 18 G | Refills: 8 | Status: SHIPPED | OUTPATIENT
Start: 2024-11-06

## 2024-11-06 RX ORDER — ATORVASTATIN CALCIUM 10 MG/1
10 TABLET, FILM COATED ORAL DAILY
Qty: 90 TABLET | Refills: 3 | Status: SHIPPED | OUTPATIENT
Start: 2024-11-06 | End: 2024-11-07

## 2024-11-06 RX ORDER — BUDESONIDE AND FORMOTEROL FUMARATE DIHYDRATE 160; 4.5 UG/1; UG/1
2 AEROSOL RESPIRATORY (INHALATION) 2 TIMES DAILY
Qty: 10.2 G | Refills: 3 | Status: SHIPPED | OUTPATIENT
Start: 2024-11-06

## 2024-11-06 RX ORDER — LISINOPRIL 10 MG/1
10 TABLET ORAL DAILY
Qty: 90 TABLET | Refills: 3 | Status: SHIPPED | OUTPATIENT
Start: 2024-11-06

## 2024-11-06 RX ORDER — CETIRIZINE HYDROCHLORIDE 10 MG/1
10 TABLET ORAL DAILY
Qty: 90 TABLET | Refills: 3 | Status: SHIPPED | OUTPATIENT
Start: 2024-11-06

## 2024-11-06 NOTE — ASSESSMENT & PLAN NOTE
Asthma is improving with treatment.  The patient is experiencing occasional daytime asthma symptoms. He is experiencing no nighttime asthma symptoms.  Counseled to void exposure to cigarette smoke.  Discussed medication dosage, use, side effects, and goals of treatment in detail.

## 2024-11-06 NOTE — PROGRESS NOTES
Chief Complaint     Med Refill, Asthma, Hypertension, and Annual Exam    History of Present Illness     Colt Prieto is a 54 y.o. male who presents to [facility] for evaluation of  annual PE/labs/refills. No acute issues or complaints. Continues to go to cardiology as directed. Discussed colonoscopy recommendation. Advised he may also do cologuard, no FH/PH of colon cancer. Pt states he will think about it and let us know which to order.            History      Past Medical History:   Diagnosis Date    Allergies     Arthritis     Asthma     Broken bones     Emphysema lung     Head injury     Sinus trouble     SOB (shortness of breath)        Past Surgical History:   Procedure Laterality Date    HIP SURGERY Right        Family History   Problem Relation Age of Onset    Lung cancer Other     Brain cancer Other     Pancreatic cancer Other     Stroke Other     Heart disease Other         Current Medications        Current Outpatient Medications:     atorvastatin (LIPITOR) 10 MG tablet, Take 1 tablet by mouth Daily., Disp: 90 tablet, Rfl: 3    cetirizine (zyrTEC) 10 MG tablet, Take 1 tablet by mouth Daily., Disp: 90 tablet, Rfl: 3    lisinopril (PRINIVIL,ZESTRIL) 10 MG tablet, Take 1 tablet by mouth Daily., Disp: 90 tablet, Rfl: 3    Symbicort 160-4.5 MCG/ACT inhaler, Inhale 2 puffs 2 (Two) Times a Day., Disp: 10.2 g, Rfl: 3    Ventolin  (90 Base) MCG/ACT inhaler, Inhale 2 puffs 4 (Four) Times a Day., Disp: 18 g, Rfl: 8     Allergies     Allergies   Allergen Reactions    Acetaminophen Unknown - Low Severity       Social History       Social History     Social History Narrative    Not on file       Immunizations     Immunization:    There is no immunization history on file for this patient.       Objective     Objective     Vital Signs:   /81 (BP Location: Left arm, Patient Position: Sitting, Cuff Size: Adult)   Pulse 68   Temp 98.7 °F (37.1 °C) (Temporal)   Resp 18    "Ht 175.3 cm (69\")   Wt 72.6 kg (160 lb)   SpO2 100%   BMI 23.63 kg/m²       Physical Exam  Vitals reviewed.   Constitutional:       General: He is not in acute distress.     Appearance: Normal appearance.   HENT:      Head: Normocephalic.      Right Ear: Tympanic membrane normal.      Left Ear: Tympanic membrane normal.      Nose: Nose normal.      Mouth/Throat:      Pharynx: Oropharynx is clear. No posterior oropharyngeal erythema.   Eyes:      General: No scleral icterus.     Extraocular Movements: Extraocular movements intact.      Conjunctiva/sclera: Conjunctivae normal.      Pupils: Pupils are equal, round, and reactive to light.   Cardiovascular:      Rate and Rhythm: Normal rate and regular rhythm.      Pulses: Normal pulses.      Heart sounds: Normal heart sounds.   Pulmonary:      Effort: Pulmonary effort is normal.      Breath sounds: Normal breath sounds.   Abdominal:      General: Bowel sounds are normal.      Palpations: Abdomen is soft.   Musculoskeletal:         General: Normal range of motion.      Cervical back: Neck supple.   Skin:     General: Skin is warm and dry.   Neurological:      Mental Status: He is alert and oriented to person, place, and time.   Psychiatric:         Mood and Affect: Mood normal.         Behavior: Behavior normal.         Thought Content: Thought content normal.         Judgment: Judgment normal.         Results      Result Review :   The following data was reviewed by: OLVIN Chiu on 11/06/2024:    Consultant notes cardiology       Assessment and Plan        Assessment and Plan    Diagnoses and all orders for this visit:    1. Annual physical exam (Primary)  -     Comprehensive metabolic panel; Future  -     CBC & Differential; Future    2. Need for hepatitis C screening test  -     Hepatitis C Antibody; Future    3. Screening for prostate cancer  -     PSA SCREENING; Future    4. Screening for thyroid disorder  -     TSH; Future    5. Essential " hypertension  Assessment & Plan:  Hypertension is improving with treatment.  Continue current treatment regimen.  Dietary sodium restriction.  Regular aerobic exercise.  Blood pressure will be reassessed at the next regular appointment.  Keep FU with cardiology.       6. Hyperlipidemia, unspecified hyperlipidemia type  Assessment & Plan:  Lipid abnormalities are  recheck today  .  Nutritional counseling was provided.  Lipids will be reassessed in 6 months.      Other orders  -     atorvastatin (LIPITOR) 10 MG tablet; Take 1 tablet by mouth Daily.  Dispense: 90 tablet; Refill: 3  -     cetirizine (zyrTEC) 10 MG tablet; Take 1 tablet by mouth Daily.  Dispense: 90 tablet; Refill: 3  -     lisinopril (PRINIVIL,ZESTRIL) 10 MG tablet; Take 1 tablet by mouth Daily.  Dispense: 90 tablet; Refill: 3  -     Symbicort 160-4.5 MCG/ACT inhaler; Inhale 2 puffs 2 (Two) Times a Day.  Dispense: 10.2 g; Refill: 3  -     Ventolin  (90 Base) MCG/ACT inhaler; Inhale 2 puffs 4 (Four) Times a Day.  Dispense: 18 g; Refill: 8              Follow Up        Follow Up   Return if symptoms worsen or fail to improve.  Patient was given instructions and counseling regarding his condition or for health maintenance advice. Please see specific information pulled into the AVS if appropriate.

## 2024-11-06 NOTE — ASSESSMENT & PLAN NOTE
Hypertension is improving with treatment.  Continue current treatment regimen.  Dietary sodium restriction.  Regular aerobic exercise.  Blood pressure will be reassessed at the next regular appointment.  Keep FU with cardiology.

## 2024-11-07 ENCOUNTER — TELEPHONE (OUTPATIENT)
Dept: CARDIOLOGY | Facility: CLINIC | Age: 54
End: 2024-11-07
Payer: MEDICAID

## 2024-11-07 DIAGNOSIS — E78.2 HYPERLIPEMIA, MIXED: Primary | ICD-10-CM

## 2024-11-07 RX ORDER — PRAVASTATIN SODIUM 10 MG
10 TABLET ORAL NIGHTLY
Qty: 90 TABLET | Refills: 3 | Status: SHIPPED | OUTPATIENT
Start: 2024-11-07

## 2024-11-07 NOTE — TELEPHONE ENCOUNTER
ILLY patient. Patient was not fasting. Patient has not been taking atorvastatin for over 3 months. Patient requesting different cholesterol medication. Patient concerned because family had severe muscle weakness after taking.

## 2024-11-07 NOTE — TELEPHONE ENCOUNTER
Stop atorvastatin and replace with pravastatin 10 mg nightly. Repeat fasting lipid panel and hepatic function panel in 3 months

## 2024-11-07 NOTE — TELEPHONE ENCOUNTER
----- Message from Mercedes Yang sent at 11/7/2024  8:23 AM EST -----  Lipid panel is elevated, find out if he has been compliant with atorvastatin

## 2024-11-17 NOTE — PROGRESS NOTES
[facility]  Cardiology progress Note    Patient Name: Colt Prieto  : 1970    CHIEF COMPLAINT  Hypertension        Subjective   Subjective     HISTORY OF PRESENT ILLNESS    Colt Prieto is a 54 y.o. male with history of hypertension.  No palpitations.    REVIEW OF SYSTEMS    Constitutional:    No fever, no weight loss  Skin:     No rash  Otolaryngeal:    No difficulty swallowing  Cardiovascular: See HPI.  Pulmonary:    No cough, no sputum production    Personal History     Social History:    reports that he has never smoked. He has never been exposed to tobacco smoke. He has never used smokeless tobacco. He reports that he does not drink alcohol and does not use drugs.    Home Medications:  Current Outpatient Medications on File Prior to Visit   Medication Sig    cetirizine (zyrTEC) 10 MG tablet Take 1 tablet by mouth Daily.    doxycycline (VIBRAMYICN) 100 MG tablet     lisinopril (PRINIVIL,ZESTRIL) 10 MG tablet Take 1 tablet by mouth Daily.    Symbicort 160-4.5 MCG/ACT inhaler Inhale 2 puffs 2 (Two) Times a Day.    Ventolin  (90 Base) MCG/ACT inhaler Inhale 2 puffs 4 (Four) Times a Day.    [DISCONTINUED] pravastatin (PRAVACHOL) 10 MG tablet Take 1 tablet by mouth Every Night. (Patient not taking: Reported on 2024)     No current facility-administered medications on file prior to visit.       Past Medical History:   Diagnosis Date    Allergies     Arthritis     Asthma     Broken bones     Emphysema lung     Head injury     Sinus trouble     SOB (shortness of breath)        Allergies:  Allergies   Allergen Reactions    Acetaminophen Unknown - Low Severity       Objective    Objective       Vitals:   Heart Rate:  [77] 77  BP: (145)/(94) 145/94  Body mass index is 23.63 kg/m².     PHYSICAL EXAM:    General Appearance:   well developed  well nourished  HENT:   oropharynx moist  lips not cyanotic  Neck:  thyroid not enlarged  supple  Respiratory:  no respiratory distress  normal  breath sounds  no rales  Cardiovascular:  no jugular venous distention  regular rhythm  apical impulse normal  S1 normal, S2 normal  no S3, no S4   no murmur  no rub, no thrill  carotid pulses normal; no bruit  pedal pulses normal  lower extremity edema: none    Skin:   warm, dry  Psychiatric:  judgement and insight appropriate  normal mood and affect        Result Review:  I have personally reviewed the available results from  [x]  Laboratory  [x]  EKG  [x]  Cardiology  [x]  Medications  [x]  Old records  []  Other:     Procedures  Lab Results   Component Value Date    CHOL 214 (H) 11/06/2024    CHOL 245 (H) 09/14/2022    CHOL 222 (H) 02/18/2022     Lab Results   Component Value Date    TRIG 147 11/06/2024    TRIG 124 09/14/2022    TRIG 92 02/18/2022     Lab Results   Component Value Date    HDL 33 (L) 11/06/2024    HDL 45 09/14/2022    HDL 48 02/18/2022     Lab Results   Component Value Date     (H) 11/06/2024     (H) 09/14/2022     (H) 02/18/2022     Lab Results   Component Value Date    VLDL 27 11/06/2024    VLDL 22 09/14/2022    VLDL 16 02/18/2022        Impression/Plan:  1.  Mixed hyperlipidemia: Continue Lipitor 10 mg once a day.  Monitor lipid and hepatic profile.  2.  Essential hypertension controlled: Continue lisinopril 10 mg once a day.  Monitor blood pressure regularly.  3.  Stable palpitations: No further palpitations.           Jose David Frazier MD   12/05/24   12:18 EST

## 2024-12-05 ENCOUNTER — OFFICE VISIT (OUTPATIENT)
Dept: CARDIOLOGY | Facility: CLINIC | Age: 54
End: 2024-12-05
Payer: MEDICAID

## 2024-12-05 VITALS
DIASTOLIC BLOOD PRESSURE: 94 MMHG | BODY MASS INDEX: 23.7 KG/M2 | HEIGHT: 69 IN | HEART RATE: 77 BPM | SYSTOLIC BLOOD PRESSURE: 145 MMHG | WEIGHT: 160 LBS

## 2024-12-05 DIAGNOSIS — I10 HYPERTENSION, ESSENTIAL: Primary | ICD-10-CM

## 2024-12-05 DIAGNOSIS — E78.2 HYPERLIPEMIA, MIXED: ICD-10-CM

## 2024-12-05 PROCEDURE — 3080F DIAST BP >= 90 MM HG: CPT | Performed by: SPECIALIST

## 2024-12-05 PROCEDURE — 1159F MED LIST DOCD IN RCRD: CPT | Performed by: SPECIALIST

## 2024-12-05 PROCEDURE — 3077F SYST BP >= 140 MM HG: CPT | Performed by: SPECIALIST

## 2024-12-05 PROCEDURE — 1160F RVW MEDS BY RX/DR IN RCRD: CPT | Performed by: SPECIALIST

## 2024-12-05 PROCEDURE — 99214 OFFICE O/P EST MOD 30 MIN: CPT | Performed by: SPECIALIST

## 2024-12-05 RX ORDER — DOXYCYCLINE HYCLATE 100 MG
TABLET ORAL
COMMUNITY
Start: 2024-11-26